# Patient Record
Sex: MALE | Race: WHITE | NOT HISPANIC OR LATINO | Employment: PART TIME | URBAN - METROPOLITAN AREA
[De-identification: names, ages, dates, MRNs, and addresses within clinical notes are randomized per-mention and may not be internally consistent; named-entity substitution may affect disease eponyms.]

---

## 2017-06-09 DIAGNOSIS — G40.209 LOCALIZATION-RELATED PARTIAL EPILEPSY WITH COMPLEX PARTIAL SEIZURES (H): ICD-10-CM

## 2017-06-09 RX ORDER — LEVETIRACETAM 500 MG/1
TABLET ORAL
Qty: 270 TABLET | Refills: 0 | Status: SHIPPED | OUTPATIENT
Start: 2017-06-09 | End: 2017-07-14

## 2017-06-19 DIAGNOSIS — G40.109 LOCALIZATION-RELATED EPILEPSY (H): ICD-10-CM

## 2017-06-19 RX ORDER — LAMOTRIGINE 100 MG/1
TABLET ORAL
Qty: 285 TABLET | Refills: 0 | OUTPATIENT
Start: 2017-06-19

## 2017-06-19 RX ORDER — LAMOTRIGINE 100 MG/1
TABLET ORAL
Qty: 285 TABLET | Refills: 0 | Status: SHIPPED | OUTPATIENT
Start: 2017-06-19 | End: 2017-07-31

## 2017-07-14 DIAGNOSIS — G40.209 LOCALIZATION-RELATED PARTIAL EPILEPSY WITH COMPLEX PARTIAL SEIZURES (H): ICD-10-CM

## 2017-07-14 RX ORDER — LEVETIRACETAM 500 MG/1
TABLET ORAL
Qty: 270 TABLET | Refills: 0 | Status: SHIPPED | OUTPATIENT
Start: 2017-07-14 | End: 2017-08-14

## 2017-07-28 DIAGNOSIS — G40.109 LOCALIZATION-RELATED EPILEPSY (H): ICD-10-CM

## 2017-07-28 RX ORDER — LAMOTRIGINE 100 MG/1
TABLET ORAL
Qty: 285 TABLET | Refills: 0 | Status: CANCELLED | OUTPATIENT
Start: 2017-07-28

## 2017-07-31 ENCOUNTER — TELEPHONE (OUTPATIENT)
Dept: NEUROLOGY | Facility: CLINIC | Age: 47
End: 2017-07-31

## 2017-07-31 DIAGNOSIS — G40.109 LOCALIZATION-RELATED EPILEPSY (H): ICD-10-CM

## 2017-07-31 RX ORDER — LAMOTRIGINE 100 MG/1
TABLET ORAL
Qty: 285 TABLET | Refills: 0 | Status: SHIPPED | OUTPATIENT
Start: 2017-07-31 | End: 2017-08-14

## 2017-08-14 ENCOUNTER — TELEPHONE (OUTPATIENT)
Dept: NEUROLOGY | Facility: CLINIC | Age: 47
End: 2017-08-14

## 2017-08-14 DIAGNOSIS — G40.109 PARTIAL SEIZURE DISORDER (H): Primary | ICD-10-CM

## 2017-08-14 DIAGNOSIS — G40.109 LOCALIZATION-RELATED EPILEPSY (H): ICD-10-CM

## 2017-08-14 DIAGNOSIS — G40.209 LOCALIZATION-RELATED PARTIAL EPILEPSY WITH COMPLEX PARTIAL SEIZURES (H): ICD-10-CM

## 2017-08-14 RX ORDER — LEVETIRACETAM 500 MG/1
TABLET ORAL
Qty: 270 TABLET | Refills: 1 | Status: SHIPPED | OUTPATIENT
Start: 2017-08-14 | End: 2017-08-17

## 2017-08-14 RX ORDER — LAMOTRIGINE 100 MG/1
TABLET ORAL
Qty: 285 TABLET | Refills: 1 | Status: SHIPPED | OUTPATIENT
Start: 2017-08-14 | End: 2017-08-17

## 2017-08-14 NOTE — TELEPHONE ENCOUNTER
Caller: Kip   Relationship to Patient: self   Call Back Number: [phone]   Reason for Call: would like to know if he needs any blood level check. Currently unable to make it to his last appt    Kip had a follow up visit scheduled 8/10/17 for which he had to cancel d/t recurrence of Lymphoma.  He is waiting to her back from [facility] regarding participation in a Lymphoma study.  Kip is in need of anticonvulsant medication refills. In addition, he would like to have AED serum levels drawn.  He will call back in the near future to schedule an office visit with Dr. Moahn.    AED - ANTIEPILEPTIC DRUGS 7/14/2017   levETIRAcetam (Oral) TAKE THREE TABLETS BY MOUTH THREE TIMES DAILY (500 MG TABS)   lamoTRIgine (Oral) TAKE FOUR AND ONE-HALF TABLETS (450MG) BY MOUTH EVERY MORNING AND FIVE TABLETS (500MG) SHADI EVENING (100 MG TABS)     PLAN:    1) FAX lab order to [facility] at [phone].  2) Mr. Dailey will call to schedule a return visit ASAP.  3) RX for LEV and LTG (with one refill) sent to pharmacy.

## 2017-08-17 ENCOUNTER — OFFICE VISIT (OUTPATIENT)
Dept: NEUROLOGY | Facility: CLINIC | Age: 47
End: 2017-08-17

## 2017-08-17 VITALS
SYSTOLIC BLOOD PRESSURE: 131 MMHG | WEIGHT: 292.4 LBS | BODY MASS INDEX: 35.61 KG/M2 | DIASTOLIC BLOOD PRESSURE: 80 MMHG | HEIGHT: 76 IN | HEART RATE: 81 BPM

## 2017-08-17 DIAGNOSIS — G40.209 LOCALIZATION-RELATED PARTIAL EPILEPSY WITH COMPLEX PARTIAL SEIZURES (H): ICD-10-CM

## 2017-08-17 DIAGNOSIS — G40.109 LOCALIZATION-RELATED EPILEPSY (H): Primary | ICD-10-CM

## 2017-08-17 DIAGNOSIS — G40.109 PARTIAL SEIZURE DISORDER (H): ICD-10-CM

## 2017-08-17 RX ORDER — LEVETIRACETAM 500 MG/1
TABLET ORAL
Qty: 270 TABLET | Refills: 11 | Status: SHIPPED | OUTPATIENT
Start: 2017-08-17 | End: 2018-07-19

## 2017-08-17 RX ORDER — LAMOTRIGINE 100 MG/1
TABLET ORAL
Qty: 270 TABLET | Refills: 11 | Status: SHIPPED | OUTPATIENT
Start: 2017-08-17 | End: 2017-09-21

## 2017-08-17 NOTE — MR AVS SNAPSHOT
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      Future tests that were ordered for you today     Open Future Orders        Priority Expected Expires Ordered    Keppra (Levetiracetam) Level Routine  8/31/2017 8/17/2017    Lamotrigine Level Routine  8/31/2017 8/17/2017            Who to contact     Please call your clinic at [phone] to:    Ask questions about your health    Make or cancel appointments    Discuss your medicines    Learn about your test results    Speak to your doctor   If you have compliments or concerns about an experience at your clinic, or if you wish to file a complaint, please contact [facility] Patient Relations at [phone] or email us at [email or web address]         Additional Information About Your Visit        MyChart Information     [facility] gives you secure access to your electronic health record. If you see a primary care provider, you can also send messages to your care team and make appointments. If you have questions, please call your primary care clinic.  If you do not have a primary care provider, please call [phone] and they will assist you.      [facility] [facility] is an electronic gateway that provides easy, online access to your medical records. With [facility], you can request a clinic appointment, read your test results, renew a prescription or communicate with your care team.     To access your existing account, please  "contact your [facility] or call [phone] for assistance.        Care EveryWhere ID     This is your Care EveryWhere ID. This could be used by other organizations to access your [place] medical records  [id number]        Your Vitals Were     Pulse Height BMI (Body Mass Index)             81 6' 4.34\" (193.9 cm) 35.28 kg/m2          Blood Pressure from Last 3 Encounters:   08/17/17 131/80   08/16/16 134/76   09/18/15 124/84    Weight from Last 3 Encounters:   08/17/17 292 lb 6.4 oz (132.6 kg)   08/16/16 300 lb (136.1 kg)   09/18/15 294 lb 9.6 oz (133.6 kg)                 Today's Medication Changes          These changes are accurate as of: 8/17/17  9:13 AM.  If you have any questions, ask your nurse or doctor.               These medicines have changed or have updated prescriptions.        Dose/Directions    lamoTRIgine 100 MG tablet   Commonly known as:  LaMICtal   This may have changed:  additional instructions   Used for:  Localization-related epilepsy (H), Partial seizure disorder (H)   Changed by:  Flaquita Tam MD        TAKE FOUR AND ONE-HALF TABLETS (450MG) BY MOUTH TWICE A DAY.   Quantity:  270 tablet   Refills:  11            Where to get your medicines      These medications were sent to [facility] [address] [place] [address]  [address] [address]     Phone:  [phone]     lamoTRIgine 100 MG tablet    levETIRAcetam 500 MG tablet                Primary Care Provider Office Phone # Fax #    Michael Sharma [phone]       [facility] [address] [facility] [address]        Equal Access to Services     MARGARITA VARMA AH: Rohan Hernandez, wafernandada luqadaha, qatatata kaalmada patrica, panfilo luna. So [facility] [phone].    ATENCIÓN: Si habla español, tiene a cueto disposición servicios gratuitos de asistencia lingüística. Llame al [phone].    We comply " with applicable federal civil rights laws and [place] laws. We do not discriminate on the basis of race, color, national origin, age, disability sex, sexual orientation or gender identity.            Thank you!     Thank you for choosing [facility]  for your care. Our goal is always to provide you with excellent care. Hearing back from our patients is one way we can continue to improve our services. Please take a few minutes to complete the written survey that you may receive in the mail after your visit with us. Thank you!             Your Updated Medication List - Protect others around you: Learn how to safely use, store and throw away your medicines at www.disposemymeds.org.          This list is accurate as of: 8/17/17  9:13 AM.  Always use your most recent med list.                   Brand Name Dispense Instructions for use Diagnosis    lamoTRIgine 100 MG tablet    LaMICtal    270 tablet    TAKE FOUR AND ONE-HALF TABLETS (450MG) BY MOUTH TWICE A DAY.    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

## 2017-08-17 NOTE — PROGRESS NOTES
[facility] Progress Note      Patient:  Kip Barajas  :  1970   Age:  47 year old   Today's Office Visit:  2017    Epilepsy Data:    Patient History  Primary Epileptologist/Provider: Flaquita Tam M.D. 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     Tests/Surgery History  Last EEG: (P) 12  Last MRI: (P) 08  Last Neuropsych Testing: (P) 08    Seizure Record  Current Visit Date: (P) 17  Previous Visit Date: (P) 16  Months since last visit: (P) 12.02  Seizure Type 1: (P) Simple partial seizures with sensory symptoms  Seizure Type 2: (P) Simple partial seizures with motor signs  Seizure Type 3: (P) Complex partial seizures unspecified  Seizure Type 4: (P) Partial seizures with secondary generalization  -  with complex partial seizures evolving to generalized seizures    History of Present Illness:     The patient did not have any seizures since last year.  His last seizure was  when he was in ICU for stem cell transplant and it got complicated by high fever and hypotension.      He had a PET scan 2 weeks ago and unfortunately his lymphoma came back.  2 new spots were spotted.  Study for Tacrolimus stopped and he discontinued the medication. Brand new drug study would start at [place] that he is gonna be eligible for.        Current Outpatient Prescriptions   Medication Sig Dispense Refill     levETIRAcetam (KEPPRA) 500 MG tablet TAKE THREE TABLETS BY MOUTH THREE TIMES DAILY 270 tablet 1     lamoTRIgine (LAMICTAL) 100 MG tablet TAKE FOUR AND ONE-HALF TABLETS (450MG) BY MOUTH EVERY MORNING AND FIVE TABLETS (500MG) SHADI EVENING 285 tablet 1      Results for KIP BARAJAS (MRN [id number]) as of 2017 08:40   Ref. Range 2016 11:30   Lamotrigine Level Unknown 21.0 (H)   Levetiracetam Level Unknown 45.2 (H)     Medication Notes:      "   AED Medication Compliance:  compliant most of the time  Using a pill box:  Yes    Review of Systems:  Lethargy / Tiredness:  No  Nausea / Vomiting:  No  Double Vision:  No  Sleepiness:  No  Depression:  No  Slowed Cognitive Function:  No  Memory Problems:  No  Poor Balance:  No  Dizziness:  No  Appetite Changes:  No  Blurred Vision:  No  Sleep Changes:  No  Behavioral Changes:  No  Skin: negative  Respiratory: No shortness of breath and No cough  Cardiovascular: negative  Have you experienced a traumatic fall since your last visit: NO    Other Issues:   Is patient safe to drive:  Yes    Exam:    /80 (BP Location: Left arm, Patient Position: Chair, Cuff Size: Adult Regular)  Pulse 81  Ht 6' 4.34\" (193.9 cm)  Wt 292 lb 6.4 oz (132.6 kg)  BMI 35.28 kg/m2     Wt Readings from Last 5 Encounters:   08/17/17 292 lb 6.4 oz (132.6 kg)   08/16/16 300 lb (136.1 kg)   09/18/15 294 lb 9.6 oz (133.6 kg)   03/06/15 294 lb (133.4 kg)   08/11/14 288 lb 9.6 oz (130.9 kg)     General appearance: Alert, awake, very pleasant, and cooperative, NAD  Gait: Wide-based gait due to body habitus. Difficulty with tandem gait.   Language/Speech: No aphasia or dysarthria.   Extraocular movements intact. No nystagmus.   Coordination: normal FNF  Face is symmetric.   Tongue is midline.   Limb strength 5/5 bilaterally with no drift and normal tone.    Assessment and Plan:     1. Localization-related epilepsy:  Seizures have been stable. Last seizure was in 2011.  He is on dual therapy with LMT and LVT. Levels are towards the higher range.  We decided to decrease LMT by 50 mg at night.    2. Tremors:  Stable, worse when he writes something, but when he concentrates it is better.  He has more trouble holding and carrying object. At this point he rather not to take any medication for it.       - Decrease lamotrigine to 450 mg bid  - Obtain lamotrigine and levetiracetam in 1-2 weeks  - RTC in 1 year      As described above, I met with the " patient for 25 minutes and during this time counseling was greater than 50% of the visit time.    Flaquita Tam MD

## 2017-08-17 NOTE — LETTER
2017       RE: Kip Dailey  : 1970   MRN: [id number]      Dear Colleague,    Thank you for referring your patient, Kip Dailey, to the [facility] at [facility]. Please see a copy of my visit note below.    [facility]      Patient:  Kip Dailey  :  1970   Age:  47 year old   Today's Office Visit:  2017    Epilepsy Data:  Patient History  Primary Epileptologist/Provider: ([place]) Flaquita Tam M.D.  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     Tests/Surgery History  Last EEG: (P) 12  Last MRI: (P) 08  Last Neuropsych Testing: (P) 08    Seizure Record  Current Visit Date: (P) 17  Previous Visit Date: (P) 16  Months since last visit: (P) 12.02  Seizure Type 1: (P) Simple partial seizures with sensory symptoms  Seizure Type 2: (P) Simple partial seizures with motor signs  Seizure Type 3: (P) Complex partial seizures unspecified  Seizure Type 4: (P) Partial seizures with secondary generalization  -  with complex partial seizures evolving to generalized seizures    History of Present Illness:   The patient did not have any seizures since last year.  His last seizure was  when he was in ICU for stem cell transplant and it got complicated by high fever and hypotension.      He had a PET scan 2 weeks ago and unfortunately his lymphoma came back.  2 new spots were spotted.  Study for Tacrolimus stopped and he discontinued the medication. Brand new drug study would start at [place] that he is gonna be eligible for.      Current Outpatient Prescriptions   Medication Sig Dispense Refill     levETIRAcetam (KEPPRA) 500 MG tablet TAKE THREE TABLETS BY MOUTH THREE TIMES DAILY 270 tablet 1     lamoTRIgine (LAMICTAL) 100 MG tablet TAKE FOUR AND ONE-HALF TABLETS (450MG) BY MOUTH EVERY  "MORNING AND FIVE TABLETS (500MG) SHADI EVENING 285 tablet 1      Results for IRLANDA BARAJAS (MRN [id number]) as of 8/17/2017 08:40   Ref. Range 8/16/2016 11:30   Lamotrigine Level Unknown 21.0 (H)   Levetiracetam Level Unknown 45.2 (H)     Medication Notes:    AED Medication Compliance:  compliant most of the time  Using a pill box:  Yes    Review of Systems:  Lethargy / Tiredness:  No  Nausea / Vomiting:  No  Double Vision:  No  Sleepiness:  No  Depression:  No  Slowed Cognitive Function:  No  Memory Problems:  No  Poor Balance:  No  Dizziness:  No  Appetite Changes:  No  Blurred Vision:  No  Sleep Changes:  No  Behavioral Changes:  No  Skin: negative  Respiratory: No shortness of breath and No cough  Cardiovascular: negative  Have you experienced a traumatic fall since your last visit: NO    Other Issues:   Is patient safe to drive:  Yes    Exam:  /80 (BP Location: Left arm, Patient Position: Chair, Cuff Size: Adult Regular)  Pulse 81  Ht 6' 4.34\" (193.9 cm)  Wt 292 lb 6.4 oz (132.6 kg)  BMI 35.28 kg/m2   Wt Readings from Last 5 Encounters:   08/17/17 292 lb 6.4 oz (132.6 kg)   08/16/16 300 lb (136.1 kg)   09/18/15 294 lb 9.6 oz (133.6 kg)   03/06/15 294 lb (133.4 kg)   08/11/14 288 lb 9.6 oz (130.9 kg)     General appearance: Alert, awake, very pleasant, and cooperative, NAD  Gait: Wide-based gait due to body habitus. Difficulty with tandem gait.   Language/Speech: No aphasia or dysarthria.   Extraocular movements intact. No nystagmus.   Coordination: normal FNF  Face is symmetric.   Tongue is midline.   Limb strength 5/5 bilaterally with no drift and normal tone.    Assessment and Plan:   1. Localization-related epilepsy:  Seizures have been stable. Last seizure was in 2011.  He is on dual therapy with LMT and LVT. Levels are towards the higher range.  We decided to decrease LMT by 50 mg at night.    2. Tremors:  Stable, worse when he writes something, but when he concentrates it is better.  He has more " trouble holding and carrying object. At this point he rather not to take any medication for it.     - Decrease lamotrigine to 450 mg bid  - Obtain lamotrigine and levetiracetam in 1-2 weeks  - RTC in 1 year    As described above, I met with the patient for 25 minutes and during this time counseling was greater than 50% of the visit time.    Flaquita Tam MD

## 2017-09-13 ENCOUNTER — TRANSFERRED RECORDS (OUTPATIENT)
Dept: HEALTH INFORMATION MANAGEMENT | Facility: CLINIC | Age: 47
End: 2017-09-13

## 2017-09-15 LAB
KEPPRA (LEVETIRACETAM) LEVEL: 46.7 MCG/ML (ref 12–46)
LAMOTRIGINE SERPL-MCNC: 12.3 MCG/ML (ref 2.5–15)

## 2017-09-21 ENCOUNTER — TELEPHONE (OUTPATIENT)
Dept: NEUROLOGY | Facility: CLINIC | Age: 47
End: 2017-09-21

## 2017-09-21 DIAGNOSIS — G40.109 PARTIAL SEIZURE DISORDER (H): ICD-10-CM

## 2017-09-21 DIAGNOSIS — G40.109 LOCALIZATION-RELATED EPILEPSY (H): Primary | ICD-10-CM

## 2017-09-21 RX ORDER — LAMOTRIGINE 100 MG/1
TABLET ORAL
Qty: 285 TABLET | Refills: 11 | Status: SHIPPED | OUTPATIENT
Start: 2017-09-21 | End: 2018-07-19

## 2017-09-21 NOTE — TELEPHONE ENCOUNTER
"Caller: Kip   Relationship to Patient: Self   Call Back Number: [phone]   Reason for Call: Patient is calling to discuss a medication change and go over recent lab results. Please give him a call back. Thanks.   Keerthi Sims CMA    Currently prescribed 450 BID.  Kip would like to know if he can increase LTG from 450 mg HS to 500 mg.  He hasn't \"felt quite right since we went down to 450 mg.  No seizures.     Ref. Range 9/13/2017 13:06   Keppra (Levetiracetam) Level Latest Ref Range: 12.0 - 46.0 mcg/mL 46.7 (A)   Lamotrigine Level Latest Ref Range: 2.5 - 15.0 mcg/mL 12.3     PLAN: Discussed with Dr. Alvares    1) Increase Lamotrigine to 450-500.  2) RX sent to pharmacy  3) Encouraged Kip to call if side effects or other concerns.    "

## 2017-10-11 ENCOUNTER — TELEPHONE (OUTPATIENT)
Dept: NEUROLOGY | Facility: CLINIC | Age: 47
End: 2017-10-11

## 2017-10-11 NOTE — TELEPHONE ENCOUNTER
[employer]V form received, via fax on 10/11/2017. Form placed in DMV folder to be completed by an RN. States that they need this done and faxed by 10/13/2017.    Donna Saldaña, CMA

## 2017-10-13 NOTE — TELEPHONE ENCOUNTER
DMV form signed, faxed to [employer] on 10/13/2017, sent to scanning, and copy mailed to patient.     Donna Saldaña, CMA

## 2018-07-09 ENCOUNTER — TELEPHONE (OUTPATIENT)
Dept: NEUROLOGY | Facility: CLINIC | Age: 48
End: 2018-07-09

## 2018-07-09 DIAGNOSIS — G40.209 LOCALIZATION-RELATED PARTIAL EPILEPSY WITH COMPLEX PARTIAL SEIZURES (H): Primary | ICD-10-CM

## 2018-07-09 NOTE — TELEPHONE ENCOUNTER
The patient calls the clinic today reporting a return of seizures over the past week. His epilepsy provider is Dr. Tam.    Assessment:  1) Diagnosis: Localization-related epilepsy    2) Description of seizure: Since July 2nd, having up to 6 seizures a day. Left side becomes numb, I'm aware of my surrounding during the seizure.   3) Date of last seizure: many years.   4) Duration of seizure: 15-30 seconds  5) Missed medication/new medications/other med changes: On going immunotherapy treatments.   6) Sleep deprivation/illness/stress, other concerns:  It was hot this past month. 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  450-(200)-500. Lovenox 175 mg injection daily (DVT this last March).  9) Tolerating the current doses of AEDs? Yes.   10) Last labs:    Results for IRLANDA BARAJAS (MRN [id number]) as of 7/9/2018 11:36   Ref. Range 8/16/2016 11:30 9/13/2017 13:06   Keppra (Levetiracetam) Level Latest Ref Range: 12.0 - 46.0 mcg/mL  46.7 (A)   Lamotrigine Level Latest Ref Range: 2.5 - 15.0 mcg/mL 21.0 (H) 12.3   Levetiracetam Level Unknown 45.2 (H)        IRLANDA BARAJAS MRN: [id number]    Date: 7/19/2018 Status: [facility]   Time: 9:30 AM Length: 30     Visit Type: [facility] RETURN [[id number]]   JEOVANNY:     Provider: Flaquita Tam MD Department: [facility]     PLAN:  Send labs to [facility].   Get labs drawn near home. Follow up with Dr. Mohan as previously scheduled. On 7/19/18.  Review with Dr. Mohan if he should continue the increase in lamotrigine until his clinic appointment.  OK to leave a voicemail with results.       ADDENDUM:  Chart discussed with Dr. Pantoja. Verbal orders for patient to continue taking the increased dose of lamotrigine as long as he is tolerating it. This was  shared with the patient by telephone.

## 2018-07-09 NOTE — TELEPHONE ENCOUNTER
----- Message from Karon Ivory LPN sent at 7/9/2018  9:11 AM CDT -----  Regarding: librado  Caller: Kip    Relationship to Patient: self    Call Back Number: [phone]    Reason for Call: Patient is having breakthrough seizures (15-30 seconds at the longest). He was told to call if this happens.

## 2018-07-10 ENCOUNTER — TRANSFERRED RECORDS (OUTPATIENT)
Dept: HEALTH INFORMATION MANAGEMENT | Facility: CLINIC | Age: 48
End: 2018-07-10

## 2018-07-10 LAB
KEPPRA (LEVETIRACETAM) LEVEL: 33.1 MCG/ML (ref 12–46)
LAMOTRIGINE SERPL-MCNC: 12.5 MCG/ML (ref 2.5–15)

## 2018-07-19 ENCOUNTER — OFFICE VISIT (OUTPATIENT)
Dept: NEUROLOGY | Facility: CLINIC | Age: 48
End: 2018-07-19
Payer: COMMERCIAL

## 2018-07-19 VITALS
WEIGHT: 315 LBS | TEMPERATURE: 97.1 F | SYSTOLIC BLOOD PRESSURE: 132 MMHG | BODY MASS INDEX: 38.2 KG/M2 | HEART RATE: 93 BPM | DIASTOLIC BLOOD PRESSURE: 86 MMHG

## 2018-07-19 DIAGNOSIS — G40.209 LOCALIZATION-RELATED PARTIAL EPILEPSY WITH COMPLEX PARTIAL SEIZURES (H): ICD-10-CM

## 2018-07-19 DIAGNOSIS — G40.109 FOCAL EPILEPSY (H): Primary | ICD-10-CM

## 2018-07-19 DIAGNOSIS — G40.109 LOCALIZATION-RELATED EPILEPSY (H): ICD-10-CM

## 2018-07-19 DIAGNOSIS — G40.109 PARTIAL SEIZURE DISORDER (H): ICD-10-CM

## 2018-07-19 RX ORDER — LEVETIRACETAM 500 MG/1
TABLET ORAL
Qty: 270 TABLET | Refills: 11 | Status: SHIPPED | OUTPATIENT
Start: 2018-07-19 | End: 2019-07-11

## 2018-07-19 RX ORDER — ENOXAPARIN SODIUM 100 MG/ML
175 INJECTION SUBCUTANEOUS EVERY 12 HOURS
COMMUNITY

## 2018-07-19 RX ORDER — LAMOTRIGINE 100 MG/1
TABLET ORAL
Qty: 345 TABLET | Refills: 11 | Status: SHIPPED | OUTPATIENT
Start: 2018-07-19 | End: 2019-07-11

## 2018-07-19 ASSESSMENT — PAIN SCALES - GENERAL: PAINLEVEL: NO PAIN (0)

## 2018-07-19 NOTE — PROGRESS NOTES
"[facility]      Patient:  Kip Dailey  :  1970   Age:  48 year old   Today's Office Visit:  2018    Epilepsy Data:    Patient History  Primary Epileptologist/Provider: Flaquita Tam M.D. 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     Tests/Surgery History  Last EEG: (P) 12  Last MRI: (P) 08  Last Neuropsych Testing: (P) 08    Seizure Record  Current Visit Date: (P) 18  Previous Visit Date: (P) 17  Months since last visit: (P) 11.04  Seizure Type 1: (P) Simple partial seizures with sensory symptoms  Seizure Type 2: (P) Simple partial seizures with motor signs  Seizure Type 3: (P) Complex partial seizures unspecified  Seizure Type 4: (P) Partial seizures with secondary generalization  -  with complex partial seizures evolving to generalized seizures    History of Present Illness:    The patient is here for a follow up on his seizures.  He noted a recurrence of his seizures since .  His seizures were controlled for a while; he thinks heat and dehydration may have triggered them.  Seizures are the same as before. He has a warning with feeling tingly mainly on left side, then left arm goes numb, left eye drifts, sometimes head turns a little to left.  He was told hometimes can say \"yes\" or \"No to questions, sometimes not.  Last 15-30 seconds, and within 5 seconds he is able to carry on a normal conversation.    They happened multiple times a day:   July 3rd: 2  July 4: 3  July 6: 6  July 7: 8  July 8: 6  July 9: 4  July 10: 3  July 12: 2  July 13: 6  July 15: 1  He increased his lamotrigine by 100 mg, it didn't help much; so he added another 100 mg/d.  He is taking this extra 200 mg at noon.  He used to take 450 mg am and 500 mg pm.  His seizures decreased.  He did not have any since .  In the past when he " was taking 500 mg am, he ws getting dizzy.      In the last visit, he informed me that his hodgkin's lymphoma recurred.  He was going to start a new medication.  He was started on Keytruda. He also participated in a drug trial AFM 13 wgich was discontinued.  Her Hodgkin's lymphoma is now in remission.  He is going to finish the therapy with Keytruda in couple months.  [facility] Dr. Michael oshea for his Hodgkin's lymphoma.  He is going to see him in treatment every 3 weeks.  He says he stays with a friend so it is not that problematic for him to go every 3 weeks.    The patient had a fall on ice in March 2018.  He was found to have a blood clot about a week later. He has a history of blood clot in his left leg.  He is currently taking Lovenox.     Current Outpatient Prescriptions   Medication Sig Dispense Refill     enoxaparin (LOVENOX) 100 MG/ML SOLN Inject 175 mg Subcutaneous every 12 hours       lamoTRIgine (LAMICTAL) 100 MG tablet TAKE FOUR AND ONE-HALF TABLETS (450MG) BY MOUTH AM and FIVE TABLETS (500 MG) HS. (Patient taking differently: 200 mg TAKE FOUR AND ONE-HALF TABLETS (450MG) BY MOUTH AM and FIVE TABLETS (500 MG) HS.) 285 tablet 11     levETIRAcetam (KEPPRA) 500 MG tablet TAKE THREE TABLETS BY MOUTH THREE TIMES DAILY 270 tablet 11     LORazepam (ATIVAN PO) Take 0.5 mg by mouth        Results for IRLANDA BARAJAS (MRN [id number]) as of 7/19/2018 09:41   Ref. Range 9/13/2017 13:06   Keppra (Levetiracetam) Level Latest Ref Range: 12.0 - 46.0 mcg/mL 46.7 (A)   Lamotrigine Level Latest Ref Range: 2.5 - 15.0 mcg/mL 12.3     Medication Notes:        AED Medication Compliance:  compliant most of the time  Using a pill box:  Yes    Review of Systems:  Lethargy / Tiredness:  No  Nausea / Vomiting:  No  Double Vision:  No  Sleepiness:  No  Depression:  No  Anxiety: some  Slowed Cognitive Function:  No  Memory Problems:  Yes  Poor Balance:  No  Dizziness:  No  Appetite Changes:  No  Blurred Vision:   No  Headache: some  Sleep Changes:  No  Behavioral Changes:  No  Skin: negative  Respiratory: No shortness of breath and No cough  Cardiovascular: negative  Have you experienced a traumatic fall since your last visit: NO    Other Issues:    Is patient safe to drive:  No    Exam:    /86 (BP Location: Left arm, Patient Position: Chair, Cuff Size: Adult Regular)  Pulse 93  Temp 97.1  F (36.2  C)  Wt 316 lb 9.6 oz (143.6 kg)  BMI 38.2 kg/m2     Wt Readings from Last 5 Encounters:   07/19/18 316 lb 9.6 oz (143.6 kg)   08/17/17 292 lb 6.4 oz (132.6 kg)   08/16/16 300 lb (136.1 kg)   09/18/15 294 lb 9.6 oz (133.6 kg)   03/06/15 294 lb (133.4 kg)     General appearance: Alert, awake, very pleasant, and cooperative, NAD  Gait: Wide-based gait due to body habitus.   Language/Speech: No aphasia or dysarthria.   Extraocular movements intact. No nystagmus.   Coordination: normal FNF  Face is symmetric.   Tongue is midline.   Limb strength 5/5 bilaterally with no drift and normal tone.    Assessment and Plan:    1. Localization-related epilepsy:   Patient's seizures have been controlled for a while but they recurred since July 3 and he has had multiple seizures per day.  He increased his lamotrigine by 100 mg at noon which did not help much so he increased it by 200 mg at noon and his seizures decreased in the last 1 was on Sunday.  On 500 mg dose in the morning he experienced dizziness.  I am going to check lamotrigine and levetiracetam level.  If the patient's seizures continue and lamotrigine level allowed we will increase lamotrigine by another 100 mg at noon if the level was too high then we will need to add another medication.  Possibility is that the patient had approximately 24 pounds since last visit which may have caused lower lamotrigine level.  The other trigger is that he needs and the dehydration which the patient has noted increased seizures.  I counseled the patient regarding weight loss.  His previous  chemotherapy medication was causing decreased appetite and since he has been off that he has had weight gain.  I advised him to choose low sugar diet, such as modified Atkins diet or low glycemic index diet which can also help with his seizure control.    - Continue lamotrigine and levetiracetam as before  - Obtain AED levels for efficacy and toxicity  - Return to clinic in 6 month      As described above, I met with the patient for 25 minutes and during this time counseling was greater than 50% of the visit time.  Flaquita Tam MD    Dictation was done via the dragon device.

## 2018-07-19 NOTE — MR AVS SNAPSHOT
After Visit Summary   7/19/2018    Kip Dailey    MRN: [id number]           Patient Information     Date Of Birth          1970        Visit Information        Provider Department      7/19/2018 9:30 AM Flaquita Tam MD [facility]        Today's Diagnoses     Focal epilepsy (H)    -  1    Localization-related partial epilepsy with complex partial seizures (H)        Partial seizure disorder (H)        Localization-related epilepsy (H)           Follow-ups after your visit        Follow-up notes from your care team     Return in about 6 months (around 1/19/2019).      Your next 10 appointments already scheduled     Won 10, 2019 10:30 AM CST   Return Visit with MD [facility] ([facility])    [address], [address]  [facility] [address] [phone]              Who to contact     Please call your clinic at [phone] to:    Ask questions about your health    Make or cancel appointments    Discuss your medicines    Learn about your test results    Speak to your doctor            Additional Information About Your Visit        Podcast Readyhart Information     IT MOVES IT gives you secure access to your electronic health record. If you see a primary care provider, you can also send messages to your care team and make appointments. If you have questions, please call your primary care clinic.  If you do not have a primary care provider, please call [phone] and they will assist you.      IT MOVES IT is an electronic gateway that provides easy, online access to your medical records. With IT MOVES IT, you can request a clinic appointment, read your test results, renew a prescription or communicate with your care team.     To access your existing account, please contact your [facility] or call [phone] for assistance.        Care EveryWhere ID     This is your Care EveryWhere ID. This could be used by other  organizations to access your [facility] medical records  [id number]        Your Vitals Were     Pulse Temperature BMI (Body Mass Index)             93 97.1  F (36.2  C) 38.2 kg/m2          Blood Pressure from Last 3 Encounters:   07/19/18 132/86   08/17/17 131/80   08/16/16 134/76    Weight from Last 3 Encounters:   07/19/18 143.6 kg (316 lb 9.6 oz)   08/17/17 132.6 kg (292 lb 6.4 oz)   08/16/16 136.1 kg (300 lb)              We Performed the Following     Keppra (Levetiracetam) Level     Lamotrigine Level          Today's Medication Changes          These changes are accurate as of 7/19/18  2:18 PM.  If you have any questions, ask your nurse or doctor.               These medicines have changed or have updated prescriptions.        Dose/Directions    lamoTRIgine 100 MG tablet   Commonly known as:  LaMICtal   This may have changed:  additional instructions   Used for:  Localization-related epilepsy (H), Partial seizure disorder (H)   Changed by:  Flaquita Tam MD        TAKE FOUR AND ONE-HALF TABLETS (450MG) BY MOUTH AM and 2 TABS AT NOON AND FIVE TABLETS (500 MG) HS.   Quantity:  345 tablet   Refills:  11            Where to get your medicines      These medications were sent to [facility] [address] [place] [address]  [address] [place] [address]     Phone:  [phone]     lamoTRIgine 100 MG tablet    levETIRAcetam 500 MG tablet                Primary Care Provider Office Phone # Fax #    Michael Sharma [phone]       [facility] [address] [facility] [address]        Equal Access to Services     [facility]: Hadswapna Hernandez, stacie cuellar, el moisealpanfilo louis. So [facility] [phone].    ATENCIÓN: Si habla español, tiene a cueto disposición servicios gratuitos de asistencia lingüística. Llame al [phone].    We comply with applicable federal civil rights laws and [place]  laws. We do not discriminate on the basis of race, color, national origin, age, disability, sex, sexual orientation, or gender identity.            Thank you!     Thank you for choosing [facility]  for your care. Our goal is always to provide you with excellent care. Hearing back from our patients is one way we can continue to improve our services. Please take a few minutes to complete the written survey that you may receive in the mail after your visit with us. Thank you!             Your Updated Medication List - Protect others around you: Learn how to safely use, store and throw away your medicines at www.disposemymeds.org.          This list is accurate as of 7/19/18  2:18 PM.  Always use your most recent med list.                   Brand Name Dispense Instructions for use Diagnosis    ATIVAN PO      Take 0.5 mg by mouth        enoxaparin 100 MG/ML Soln    LOVENOX     Inject 175 mg Subcutaneous every 12 hours        lamoTRIgine 100 MG tablet    LaMICtal    345 tablet    TAKE FOUR AND ONE-HALF TABLETS (450MG) BY MOUTH AM and 2 TABS AT NOON AND FIVE TABLETS (500 MG) HS.    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

## 2018-07-19 NOTE — LETTER
"2018       RE: Kip Dailey  : 1970   MRN: [id number]      Dear Colleague,    Thank you for referring your patient, Kip Dailey, to the [facility] Please see a copy of my visit note below.    [facility] [place] [facility]      Patient:  Kip Dailey  :  1970   Age:  48 year old   Today's Office Visit:  2018    Epilepsy Data:    Patient History  Primary Epileptologist/Provider: DAVE) Flaquita Tma M.D.  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     Tests/Surgery History  Last EEG: (P) 12  Last MRI: (P) 08  Last Neuropsych Testing: (P) 08    Seizure Record  Current Visit Date: (P) 18  Previous Visit Date: (P) 17  Months since last visit: (P) 11.04  Seizure Type 1: (P) Simple partial seizures with sensory symptoms  Seizure Type 2: (P) Simple partial seizures with motor signs  Seizure Type 3: (P) Complex partial seizures unspecified  Seizure Type 4: (P) Partial seizures with secondary generalization  -  with complex partial seizures evolving to generalized seizures    History of Present Illness:    The patient is here for a follow up on his seizures.  He noted a recurrence of his seizures since .  His seizures were controlled for a while; he thinks heat and dehydration may have triggered them.  Seizures are the same as before. He has a warning with feeling tingly mainly on left side, then left arm goes numb, left eye drifts, sometimes head turns a little to left.  He was told hometimes can say \"yes\" or \"No to questions, sometimes not.  Last 15-30 seconds, and within 5 seconds he is able to carry on a normal conversation.    They happened multiple times a day:   : 2  July 4: 3  July 6: 6  July 7: 8  July 8: 6  July 9: 4  July 10: 3  July 12: 2  July 13: " 6  July 15: 1  He increased his lamotrigine by 100 mg, it didn't help much; so he added another 100 mg/d.  He is taking this extra 200 mg at noon.  He used to take 450 mg am and 500 mg pm.  His seizures decreased.  He did not have any since Sunday.  In the past when he was taking 500 mg am, he ws getting dizzy.      In the last visit, he informed me that his hodgkin's lymphoma recurred.  He was going to start a new medication.  He was started on Keytruda. He also participated in a drug trial AFM 13 wgich was discontinued.  Her Hodgkin's lymphoma is now in remission.  He is going to finish the therapy with Keytruda in couple months.  He is followed at [facility] by Dr. Michael oshea for his Hodgkin's lymphoma.  He is going to see him in treatment every 3 weeks.  He says he stays with a friend so it is not that problematic for him to go every 3 weeks.    The patient had a fall on ice in March 2018.  He was found to have a blood clot about a week later. He has a history of blood clot in his left leg.  He is currently taking Lovenox.     Current Outpatient Prescriptions   Medication Sig Dispense Refill     enoxaparin (LOVENOX) 100 MG/ML SOLN Inject 175 mg Subcutaneous every 12 hours       lamoTRIgine (LAMICTAL) 100 MG tablet TAKE FOUR AND ONE-HALF TABLETS (450MG) BY MOUTH AM and FIVE TABLETS (500 MG) HS. (Patient taking differently: 200 mg TAKE FOUR AND ONE-HALF TABLETS (450MG) BY MOUTH AM and FIVE TABLETS (500 MG) HS.) 285 tablet 11     levETIRAcetam (KEPPRA) 500 MG tablet TAKE THREE TABLETS BY MOUTH THREE TIMES DAILY 270 tablet 11     LORazepam (ATIVAN PO) Take 0.5 mg by mouth        Results for IRLANDA BARAJAS (MRN [id number]) as of 7/19/2018 09:41   Ref. Range 9/13/2017 13:06   Keppra (Levetiracetam) Level Latest Ref Range: 12.0 - 46.0 mcg/mL 46.7 (A)   Lamotrigine Level Latest Ref Range: 2.5 - 15.0 mcg/mL 12.3     Medication Notes:        AED Medication Compliance:  compliant most of the time  Using a pill box:   Yes    Review of Systems:  Lethargy / Tiredness:  No  Nausea / Vomiting:  No  Double Vision:  No  Sleepiness:  No  Depression:  No  Anxiety: some  Slowed Cognitive Function:  No  Memory Problems:  Yes  Poor Balance:  No  Dizziness:  No  Appetite Changes:  No  Blurred Vision:  No  Headache: some  Sleep Changes:  No  Behavioral Changes:  No  Skin: negative  Respiratory: No shortness of breath and No cough  Cardiovascular: negative  Have you experienced a traumatic fall since your last visit: NO    Other Issues:    Is patient safe to drive:  No    Exam:    /86 (BP Location: Left arm, Patient Position: Chair, Cuff Size: Adult Regular)  Pulse 93  Temp 97.1  F (36.2  C)  Wt 316 lb 9.6 oz (143.6 kg)  BMI 38.2 kg/m2     Wt Readings from Last 5 Encounters:   07/19/18 316 lb 9.6 oz (143.6 kg)   08/17/17 292 lb 6.4 oz (132.6 kg)   08/16/16 300 lb (136.1 kg)   09/18/15 294 lb 9.6 oz (133.6 kg)   03/06/15 294 lb (133.4 kg)     General appearance: Alert, awake, very pleasant, and cooperative, NAD  Gait: Wide-based gait due to body habitus.   Language/Speech: No aphasia or dysarthria.   Extraocular movements intact. No nystagmus.   Coordination: normal FNF  Face is symmetric.   Tongue is midline.   Limb strength 5/5 bilaterally with no drift and normal tone.    Assessment and Plan:    1. Localization-related epilepsy:    Patient's seizures have been controlled for a while but they recurred since July 3 and he has had multiple seizures per day.  He increased his lamotrigine by 100 mg at noon which did not help much so he increased it by 200 mg at noon and his seizures decreased in the last 1 was on Sunday.  On 500 mg dose in the morning he experienced dizziness.  I am going to check lamotrigine and levetiracetam level.  If the patient's seizures continue and lamotrigine level allowed we will increase lamotrigine by another 100 mg at noon if the level was too high then we will need to add another medication.  Possibility  is that the patient had approximately 24 pounds since last visit which may have caused lower lamotrigine level.  The other trigger is that he needs and the dehydration which the patient has noted increased seizures.  I counseled the patient regarding weight loss.  His previous chemotherapy medication was causing decreased appetite and since he has been off that he has had weight gain.  I advised him to choose low sugar diet, such as modified Atkins diet or low glycemic index diet which can also help with his seizure control.    - Continue lamotrigine and levetiracetam as before  - Obtain AED levels for efficacy and toxicity  - Return to clinic in 6 month      As described above, I met with the patient for 25 minutes and during this time counseling was greater than 50% of the visit time.  Flaquita Tam MD    Dictation was done via the dragon device.                      Again, thank you for allowing me to participate in the care of your patient.      Sincerely,    Flaquita Tam MD

## 2018-07-21 LAB
LAMOTRIGINE SERPL-MCNC: 12.7 UG/ML (ref 2.5–15)
LEVETIRACETAM SERPL-MCNC: 45 UG/ML (ref 12–46)

## 2018-07-27 DIAGNOSIS — G40.209 LOCALIZATION-RELATED PARTIAL EPILEPSY WITH COMPLEX PARTIAL SEIZURES (H): ICD-10-CM

## 2018-07-31 ENCOUNTER — TELEPHONE (OUTPATIENT)
Dept: NEUROLOGY | Facility: CLINIC | Age: 48
End: 2018-07-31

## 2018-07-31 NOTE — LETTER
2018       RE: Kip Dailey  : 1970   MRN: [id number]      To: Lavonne Story, EMS     Kip Dailey is a patient under my care for treatment of seizures.     Kip has been cleared to return to work.    Sincerely,              Flaquita Tam MD      Copy mailed to home address    /[facility]

## 2018-07-31 NOTE — TELEPHONE ENCOUNTER
Discussed with Dr. Mohan, Kip has clearance to return to work. Letter prepared and routed to MD for signature

## 2019-01-10 ENCOUNTER — OFFICE VISIT (OUTPATIENT)
Dept: NEUROLOGY | Facility: CLINIC | Age: 49
End: 2019-01-10
Payer: COMMERCIAL

## 2019-01-10 VITALS
HEART RATE: 75 BPM | SYSTOLIC BLOOD PRESSURE: 110 MMHG | BODY MASS INDEX: 36.07 KG/M2 | TEMPERATURE: 97.6 F | DIASTOLIC BLOOD PRESSURE: 76 MMHG | WEIGHT: 299 LBS

## 2019-01-10 DIAGNOSIS — G40.109 FOCAL EPILEPSY (H): Primary | ICD-10-CM

## 2019-01-10 ASSESSMENT — PAIN SCALES - GENERAL: PAINLEVEL: MILD PAIN (2)

## 2019-01-10 NOTE — LETTER
"1/10/2019       RE: Kip Dailey  : 1970   MRN: [id number]      Dear Colleague,    Thank you for referring your patient, Kip Dailey, to the [facility] EPILEPSY CARE at [facility]. Please see a copy of my visit note below.    [facility] [place] [facility]      Patient:  Kip Dailey  :  1970   Age:  48 year old   Today's Office Visit:  1/10/2019    Epilepsy Data:    Patient History  Primary Epileptologist/Provider: Flaquita Tam M.D.  Patient Status: Controlled  Epilepsy Syndrome: Parietal lobe epilepsy  Epilepsy Syndrome Status: Final  Etiology  : Malformation of Cortical Development     Tests/Surgery History  Last EE12  Last MRI: 08  Last Neuropsych Testin08    Seizure Record  Current Visit Date: 01/10/19  Previous Visit Date: 18  Months since last visit: 5.75  Seizure Type 1: Simple partial seizures with sensory symptoms  Seizure Type 2: Simple partial seizures with motor signs  Seizure Type 3: Complex partial seizures unspecified  Seizure Type 4: Partial seizures with secondary generalization  -  with complex partial seizures evolving to generalized seizures    History of Present Illness:     Kip is here for a follow up on his seizures. He had a couple auras with \"feeling something is going to happen\" and one episode with tingling in his left arm which lasted few seconds, but t didn't go any further. Eversince he added the mid-day lamotrigine, he did not have any seizures with NAE. He is taking -200-500 and levetiracetam 1500 mg tid. He denies side effects including dizziness, imbalance, double vision and mood changes.     Hodgkin's lymphoma: he is following up at [place]. The trial study with the new medication has finished on 2018. His next scan would be in April to evaluate for recurrence.       Current Outpatient Medications   Medication Sig Dispense Refill     lamoTRIgine (LAMICTAL) 100 MG tablet TAKE " FOUR AND ONE-HALF TABLETS (450MG) BY MOUTH AM and 2 TABS AT NOON AND FIVE TABLETS (500 MG) HS. 345 tablet 11     levETIRAcetam (KEPPRA) 500 MG tablet TAKE THREE TABLETS BY MOUTH THREE TIMES DAILY 270 tablet 11     LORazepam (ATIVAN PO) Take 0.5 mg by mouth       enoxaparin (LOVENOX) 100 MG/ML SOLN Inject 175 mg Subcutaneous every 12 hours        Results for IRLANDA BARAJAS (MRN [id number]) as of 1/10/2019 10:49   Ref. Range 7/19/2018 09:58   Keppra (Levetiracetam) Level Latest Ref Range: 12 - 46 ug/mL 45   Lamotrigine Level Latest Ref Range: 2.5 - 15.0 ug/mL 12.7     Medication Notes:        AED Medication Compliance:  compliant most of the time  Using a pill box:  Yes    Review of Systems:  Lethargy / Tiredness:  No  Nausea / Vomiting:  No  Double Vision:  No  Sleepiness:  No  Depression:  No  Slowed Cognitive Function:  No  Memory Problems:  Yes, stable  Poor Balance:  No  Dizziness:  No  Appetite Changes:  No  Blurred Vision:  No  Sleep Changes:  No  Behavioral Changes:  No  Skin: negative  Respiratory: No shortness of breath and No cough  Cardiovascular: negative  Have you experienced a traumatic fall since your last visit: he slipped on the ice in front of his home while trying to salt the ice. Didn't have injury.     Other Issues:    Is patient safe to drive:  Yes    Exam:    /76 (BP Location: Right arm, Patient Position: Chair, Cuff Size: Adult Regular)   Pulse 75   Temp 97.6  F (36.4  C)   Wt 299 lb (135.6 kg)   BMI 36.07 kg/m        Wt Readings from Last 5 Encounters:   01/10/19 299 lb (135.6 kg)   07/19/18 316 lb 9.6 oz (143.6 kg)   08/17/17 292 lb 6.4 oz (132.6 kg)   08/16/16 300 lb (136.1 kg)   09/18/15 294 lb 9.6 oz (133.6 kg)     General appearance: Alert, awake, very pleasant, and cooperative, NAD  Gait: Wide-based gait due to body habitus.   Language/Speech: No aphasia or dysarthria.   Extraocular movements intact. No nystagmus.   Coordination: normal FNF  Face is symmetric.   Tongue is  midline.   Limb strength 5/5 bilaterally with no drift and normal tone.    Assessment and Plan:     Focal epilepsy: seizures are controlled on the current dose of lamotrigine and levetiracetam. He denies side effects. In the last visit, he had some weight gait. We discussed low sugar diet and he has lost about 17 lbs.     - Continue lamotrigine and levetiracetam as before  - RTC in 6 months        As described above, I met with the patient for 20 minutes and during this time counseling was within 50% of the visit time.  Flaquita Tam MD                    Again, thank you for allowing me to participate in the care of your patient.      Sincerely,    Flaquita Tam MD

## 2019-01-10 NOTE — PROGRESS NOTES
"[facility] [place]/[facility] Progress Note      Patient:  Kip Dailey  :  1970   Age:  48 year old   Today's Office Visit:  1/10/2019    Epilepsy Data:    Patient History  Primary Epileptologist/Provider: Flaquita Tam M.D.  Patient Status: Controlled  Epilepsy Syndrome: Parietal lobe epilepsy  Epilepsy Syndrome Status: Final  Etiology  : Malformation of Cortical Development     Tests/Surgery History  Last EE12  Last MRI: 08  Last Neuropsych Testin08    Seizure Record  Current Visit Date: 01/10/19  Previous Visit Date: 18  Months since last visit: 5.75  Seizure Type 1: Simple partial seizures with sensory symptoms  Seizure Type 2: Simple partial seizures with motor signs  Seizure Type 3: Complex partial seizures unspecified  Seizure Type 4: Partial seizures with secondary generalization  -  with complex partial seizures evolving to generalized seizures    History of Present Illness:     Kip is here for a follow up on his seizures. He had a couple auras with \"feeling something is going to happen\" and one episode with tingling in his left arm which lasted few seconds, but t didn't go any further. Eversince he added the mid-day lamotrigine, he did not have any seizures with NAE. He is taking -200-500 and levetiracetam 1500 mg tid. He denies side effects including dizziness, imbalance, double vision and mood changes.     Hodgkin's lymphoma: he is following up at [place]. The trial study with the new medication has finished on 2018. His next scan would be in April to evaluate for recurrence.       Current Outpatient Medications   Medication Sig Dispense Refill     lamoTRIgine (LAMICTAL) 100 MG tablet TAKE FOUR AND ONE-HALF TABLETS (450MG) BY MOUTH AM and 2 TABS AT NOON AND FIVE TABLETS (500 MG) HS. 345 tablet 11     levETIRAcetam (KEPPRA) 500 MG tablet TAKE THREE TABLETS BY MOUTH THREE TIMES DAILY 270 tablet 11     LORazepam (ATIVAN PO) Take 0.5 mg by mouth       " enoxaparin (LOVENOX) 100 MG/ML SOLN Inject 175 mg Subcutaneous every 12 hours        Results for IRLANDA BARAJAS (MRN [id number]) as of 1/10/2019 10:49   Ref. Range 7/19/2018 09:58   Keppra (Levetiracetam) Level Latest Ref Range: 12 - 46 ug/mL 45   Lamotrigine Level Latest Ref Range: 2.5 - 15.0 ug/mL 12.7     Medication Notes:        AED Medication Compliance:  compliant most of the time  Using a pill box:  Yes    Review of Systems:  Lethargy / Tiredness:  No  Nausea / Vomiting:  No  Double Vision:  No  Sleepiness:  No  Depression:  No  Slowed Cognitive Function:  No  Memory Problems:  Yes, stable  Poor Balance:  No  Dizziness:  No  Appetite Changes:  No  Blurred Vision:  No  Sleep Changes:  No  Behavioral Changes:  No  Skin: negative  Respiratory: No shortness of breath and No cough  Cardiovascular: negative  Have you experienced a traumatic fall since your last visit: he slipped on the ice in front of his home while trying to salt the ice. Didn't have injury.     Other Issues:    Is patient safe to drive:  Yes    Exam:    /76 (BP Location: Right arm, Patient Position: Chair, Cuff Size: Adult Regular)   Pulse 75   Temp 97.6  F (36.4  C)   Wt 299 lb (135.6 kg)   BMI 36.07 kg/m       Wt Readings from Last 5 Encounters:   01/10/19 299 lb (135.6 kg)   07/19/18 316 lb 9.6 oz (143.6 kg)   08/17/17 292 lb 6.4 oz (132.6 kg)   08/16/16 300 lb (136.1 kg)   09/18/15 294 lb 9.6 oz (133.6 kg)     General appearance: Alert, awake, very pleasant, and cooperative, NAD  Gait: Wide-based gait due to body habitus.   Language/Speech: No aphasia or dysarthria.   Extraocular movements intact. No nystagmus.   Coordination: normal FNF  Face is symmetric.   Tongue is midline.   Limb strength 5/5 bilaterally with no drift and normal tone.    Assessment and Plan:     Focal epilepsy: seizures are controlled on the current dose of lamotrigine and levetiracetam. He denies side effects. In the last visit, he had some weight gait. We  discussed low sugar diet and he has lost about 17 lbs.     - Continue lamotrigine and levetiracetam as before  - RTC in 6 months        As described above, I met with the patient for 20 minutes and during this time counseling was within 50% of the visit time.  Flaquita Tam MD

## 2019-01-23 ENCOUNTER — TELEPHONE (OUTPATIENT)
Dept: NEUROLOGY | Facility: CLINIC | Age: 49
End: 2019-01-23

## 2019-01-23 NOTE — TELEPHONE ENCOUNTER
Form received via mail on 1/23/19. Form placed in nurse folder for completion.     DMV form completed. Forwarded to MD for approval and signature

## 2019-04-12 ENCOUNTER — TELEPHONE (OUTPATIENT)
Dept: NEUROLOGY | Facility: CLINIC | Age: 49
End: 2019-04-12

## 2019-04-18 ENCOUNTER — TELEPHONE (OUTPATIENT)
Dept: NEUROLOGY | Facility: CLINIC | Age: 49
End: 2019-04-18

## 2019-04-19 NOTE — TELEPHONE ENCOUNTER
DMV form signed, faxed to [employer] on 04/18/2019, sent to scanning, and copy mailed to patient. I spoke to the patient and let him know that he could come and get a copy to take to the DMV but he lives in [place] so he stated he will wait for the paper copy and bring that to the DMV when it arrives at his house.

## 2019-07-11 ENCOUNTER — OFFICE VISIT (OUTPATIENT)
Dept: NEUROLOGY | Facility: CLINIC | Age: 49
End: 2019-07-11
Payer: COMMERCIAL

## 2019-07-11 VITALS
WEIGHT: 288.8 LBS | SYSTOLIC BLOOD PRESSURE: 111 MMHG | DIASTOLIC BLOOD PRESSURE: 73 MMHG | HEART RATE: 71 BPM | BODY MASS INDEX: 34.84 KG/M2

## 2019-07-11 DIAGNOSIS — G40.109 LOCALIZATION-RELATED EPILEPSY (H): ICD-10-CM

## 2019-07-11 DIAGNOSIS — G40.209 LOCALIZATION-RELATED PARTIAL EPILEPSY WITH COMPLEX PARTIAL SEIZURES (H): ICD-10-CM

## 2019-07-11 DIAGNOSIS — G40.109 PARTIAL SEIZURE DISORDER (H): ICD-10-CM

## 2019-07-11 RX ORDER — LEVETIRACETAM 500 MG/1
TABLET ORAL
Qty: 270 TABLET | Refills: 11 | Status: SHIPPED | OUTPATIENT
Start: 2019-07-11 | End: 2020-07-16

## 2019-07-11 RX ORDER — LAMOTRIGINE 100 MG/1
TABLET ORAL
Qty: 345 TABLET | Refills: 11 | Status: SHIPPED | OUTPATIENT
Start: 2019-07-11 | End: 2019-07-17

## 2019-07-11 ASSESSMENT — PAIN SCALES - GENERAL: PAINLEVEL: NO PAIN (0)

## 2019-07-11 NOTE — PROGRESS NOTES
[facility] Progress Note      Patient:  Kip Dailey  :  1970   Age:  49 year old   Today's Office Visit:  2019    Epilepsy Data:    Patient History  Primary Epileptologist/Provider: Flaquita Tam M.D. 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     Tests/Surgery History  Last EEG: (P) 12  Last MRI: (P) 08  Last Neuropsych Testing: (P) 08    Seizure Record  Current Visit Date: (P) 19  Previous Visit Date: (P) 01/10/19  Months since last visit: (P) 5.98  Seizure Type 1: (P) Simple partial seizures with sensory symptoms  Seizure Type 2: (P) Simple partial seizures with motor signs  Seizure Type 3: (P) Complex partial seizures unspecified  Seizure Type 4: (P) Partial seizures with secondary generalization  -  with complex partial seizures evolving to generalized seizures    History of Present Illness:     Kip is here for a follow up on his seizures. He had couple moments which he felt he was going to have a seizure, but this did not progress to any more severe symptoms. He denies any seizures with LOC. He is taking -200-500 and LVT [id number]. He has some imbalance, some of it is due to arthritis and body habitus. Medication levels not supratherapeutic.     Current Outpatient Medications   Medication Sig Dispense Refill     lamoTRIgine (LAMICTAL) 100 MG tablet TAKE FOUR AND ONE-HALF TABLETS (450MG) BY MOUTH AM and 2 TABS AT NOON AND FIVE TABLETS (500 MG) HS. 345 tablet 11     levETIRAcetam (KEPPRA) 500 MG tablet TAKE THREE TABLETS BY MOUTH THREE TIMES DAILY 270 tablet 11     LORazepam (ATIVAN PO) Take 0.5 mg by mouth       enoxaparin (LOVENOX) 100 MG/ML SOLN Inject 175 mg Subcutaneous every 12 hours        Medication Notes:        AED Medication Compliance:  compliant most of the time  Using a pill box:  Yes    Review of  Systems:  Lethargy / Tiredness:  Yes, tired after work  Nausea / Vomiting:  No  Double Vision:  No  Sleepiness:  No  Depression:  No  Slowed Cognitive Function:  No  Memory Problems:  Yes  Poor Balance:  Some, stable  Dizziness:  No  Appetite Changes:  No  Blurred Vision:  No  Sleep Changes:  No  Behavioral Changes:  No  Skin: negative  Respiratory: No shortness of breath and No cough  Cardiovascular: negative  Have you experienced a traumatic fall since your last visit: NO    Other Issues:    Is patient safe to drive:  Yes    Exam:    /73   Pulse 71   Wt 288 lb 12.8 oz (131 kg)   BMI 34.84 kg/m       Wt Readings from Last 5 Encounters:   07/11/19 288 lb 12.8 oz (131 kg)   01/10/19 299 lb (135.6 kg)   07/19/18 316 lb 9.6 oz (143.6 kg)   08/17/17 292 lb 6.4 oz (132.6 kg)   08/16/16 300 lb (136.1 kg)     General appearance: Alert, awake, very pleasant, and cooperative, NAD  Gait: Wide-based gait due to body habitus.   Language/Speech: No aphasia or dysarthria.   Extraocular movements intact. No nystagmus.   Coordination: normal FNF  Face is symmetric.   Tongue is midline.   Limb strength 5/5 bilaterally with no drift and normal tone.     Assessment and Plan:     Focal epilepsy: focal impaired-aware seizures are controlled on the current dose of lamotrigine and levetiracetam. He had couple events which he felt he was going to have a seizure, but it did not progress further. He denies side effects. He had approximately 10 lbs WL compared to last visit, 6 months ago. Has some imbalance which has been stable and is somewhat due to arthritis and body habitus. I will check LMT level for toxicity.     - Continue lamotrigine and LVT  - Obtain AED levels   - RTC in 6 months        As described above, I met with the patient for 15 minutes and during this time counseling was greater than 50% of the visit time.  Flaquita Tam MD

## 2019-07-11 NOTE — NURSING NOTE
Lab time drawn: 11:45 am    Last dose of med taken: 7 :00 am today.    Willa Griffin,CMA   25-May-2019

## 2019-07-11 NOTE — LETTER
2019       RE: Kip Dailey  : 1970   MRN: [id number]      Dear Colleague,    Thank you for referring your patient, Kip Dailey, to the [facility] EPILEPSY CARE at [facility]. Please see a copy of my visit note below.    [facility] [place]      Patient:  Kip Dailey  :  1970   Age:  49 year old   Today's Office Visit:  2019    Epilepsy Data:    Patient History  Primary Epileptologist/Provider: (AL[place]) Flaquita Tam M.D.  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     Tests/Surgery History  Last EEG: (P) 12  Last MRI: (P) 08  Last Neuropsych Testing: (P) 08    Seizure Record  Current Visit Date: (P) 19  Previous Visit Date: (P) 01/10/19  Months since last visit: (P) 5.98  Seizure Type 1: (P) Simple partial seizures with sensory symptoms  Seizure Type 2: (P) Simple partial seizures with motor signs  Seizure Type 3: (P) Complex partial seizures unspecified  Seizure Type 4: (P) Partial seizures with secondary generalization  -  with complex partial seizures evolving to generalized seizures    History of Present Illness:     Kip is here for a follow up on his seizures. He had couple moments which he felt he was going to have a seizure, but this did not progress to any more severe symptoms. He denies any seizures with LOC. He is taking -200-500 and LVT [id number]. He has some imbalance, some of it is due to arthritis and body habitus. Medication levels not supratherapeutic.     Current Outpatient Medications   Medication Sig Dispense Refill     lamoTRIgine (LAMICTAL) 100 MG tablet TAKE FOUR AND ONE-HALF TABLETS (450MG) BY MOUTH AM and 2 TABS AT NOON AND FIVE TABLETS (500 MG) HS. 345 tablet 11     levETIRAcetam (KEPPRA) 500 MG tablet TAKE THREE TABLETS BY MOUTH THREE TIMES DAILY 270  tablet 11     LORazepam (ATIVAN PO) Take 0.5 mg by mouth       enoxaparin (LOVENOX) 100 MG/ML SOLN Inject 175 mg Subcutaneous every 12 hours        Medication Notes:        AED Medication Compliance:  compliant most of the time  Using a pill box:  Yes    Review of Systems:  Lethargy / Tiredness:  Yes, tired after work  Nausea / Vomiting:  No  Double Vision:  No  Sleepiness:  No  Depression:  No  Slowed Cognitive Function:  No  Memory Problems:  Yes  Poor Balance:  Some, stable  Dizziness:  No  Appetite Changes:  No  Blurred Vision:  No  Sleep Changes:  No  Behavioral Changes:  No  Skin: negative  Respiratory: No shortness of breath and No cough  Cardiovascular: negative  Have you experienced a traumatic fall since your last visit: NO    Other Issues:    Is patient safe to drive:  Yes    Exam:    /73   Pulse 71   Wt 288 lb 12.8 oz (131 kg)   BMI 34.84 kg/m        Wt Readings from Last 5 Encounters:   07/11/19 288 lb 12.8 oz (131 kg)   01/10/19 299 lb (135.6 kg)   07/19/18 316 lb 9.6 oz (143.6 kg)   08/17/17 292 lb 6.4 oz (132.6 kg)   08/16/16 300 lb (136.1 kg)     General appearance: Alert, awake, very pleasant, and cooperative, NAD  Gait: Wide-based gait due to body habitus.   Language/Speech: No aphasia or dysarthria.   Extraocular movements intact. No nystagmus.   Coordination: normal FNF  Face is symmetric.   Tongue is midline.   Limb strength 5/5 bilaterally with no drift and normal tone.     Assessment and Plan:     Focal epilepsy: focal impaired-aware seizures are controlled on the current dose of lamotrigine and levetiracetam. He had couple events which he felt he was going to have a seizure, but it did not progress further. He denies side effects. He had approximately 10 lbs WL compared to last visit, 6 months ago. Has some imbalance which has been stable and is somewhat due to arthritis and body habitus. I will check LMT level for toxicity.     - Continue lamotrigine and LVT  - Obtain AED levels    - RTC in 6 months        As described above, I met with the patient for 15 minutes and during this time counseling was greater than 50% of the visit time.  Flaquita Tam MD                    Again, thank you for allowing me to participate in the care of your patient.      Sincerely,    Flaquita Tam MD

## 2019-07-13 LAB
LAMOTRIGINE SERPL-MCNC: 15.7 UG/ML (ref 2.5–15)
LEVETIRACETAM SERPL-MCNC: 31 UG/ML (ref 12–46)

## 2019-07-17 DIAGNOSIS — G40.109 LOCALIZATION-RELATED EPILEPSY (H): ICD-10-CM

## 2019-07-17 DIAGNOSIS — G40.109 PARTIAL SEIZURE DISORDER (H): ICD-10-CM

## 2019-07-17 RX ORDER — LAMOTRIGINE 100 MG/1
TABLET ORAL
Qty: 330 TABLET | Refills: 11 | Status: SHIPPED | OUTPATIENT
Start: 2019-07-17 | End: 2020-01-16

## 2019-11-19 ENCOUNTER — TELEPHONE (OUTPATIENT)
Dept: NEUROLOGY | Facility: CLINIC | Age: 49
End: 2019-11-19

## 2019-11-19 DIAGNOSIS — G40.109 LOCALIZATION-RELATED EPILEPSY (H): Primary | ICD-10-CM

## 2019-11-19 NOTE — TELEPHONE ENCOUNTER
What is the concern that needs to be addressed by a nurse? A few breakthrough seizures on Saturday, all lasting less than one minute.  Patient self increased Lamictal noon dose increase from 200 to 300 mg, and the 7 PM dose increased to 500 mg.  Yesterday had five seizures lasting no more than 1 minute.  All seizures were roughly two hours apart both yesterday and Saturday.  Patient would like to know if he should have a lab draw.    May a detailed message be left on voicemail? Yes    Date of last office visit: 7/11/19    Message routed to: [facility] RN Pool

## 2019-11-19 NOTE — TELEPHONE ENCOUNTER
Writer spoke with Kip who reports he had a seizure which lasted for 1 minute or less. He was sitting down during the seizure, no injury.    Due to having had a seizure, Kip reports that he increased his medication.     Kip reports he has increased Medication as follows:    Lamotrigine: 200 - 300 - 500 (different than what is reflected in patient chart)  Currently taking Lamotrigine 450-300-500    Keppra: 1500 - 1500  Current Keppra dose: [id number]    Most recent lab level drawn 10/23/19 (Lamotrigine) returned at 14.7.    Kip reports that he's had two seizures today. He experiences an aura lasting less than 30 seconds, he sits down, his left arm becomes numb, his head will often turn to the left, the seizure quickly resolves.

## 2019-11-19 NOTE — TELEPHONE ENCOUNTER
Writer spoke with Kip who reports he had a seizure which lasted for 1 minute or less. He was sitting down during the seizure, no injury.    Due to having had a seizure, Kip reports that he increased his medication.     Kip reports he has increased Medication as follows:    Lamotrigine: 200 - 300 - 500 (different than what is reflected in patient chart)  Keppra: 1500 - 1500    Most recent lab level drawn 10/23/19 (Lamotrigine) returned at 14.7.

## 2020-01-16 ENCOUNTER — OFFICE VISIT (OUTPATIENT)
Dept: NEUROLOGY | Facility: CLINIC | Age: 50
End: 2020-01-16
Payer: COMMERCIAL

## 2020-01-16 VITALS
HEART RATE: 73 BPM | DIASTOLIC BLOOD PRESSURE: 88 MMHG | BODY MASS INDEX: 35.59 KG/M2 | SYSTOLIC BLOOD PRESSURE: 131 MMHG | WEIGHT: 295 LBS

## 2020-01-16 DIAGNOSIS — G40.109 LOCALIZATION-RELATED EPILEPSY (H): ICD-10-CM

## 2020-01-16 DIAGNOSIS — G40.109 PARTIAL SEIZURE DISORDER (H): ICD-10-CM

## 2020-01-16 RX ORDER — LAMOTRIGINE 100 MG/1
TABLET ORAL
Qty: 360 TABLET | Refills: 11 | Status: SHIPPED | OUTPATIENT
Start: 2020-01-16 | End: 2020-01-22

## 2020-01-16 ASSESSMENT — PAIN SCALES - GENERAL: PAINLEVEL: NO PAIN (0)

## 2020-01-16 NOTE — PROGRESS NOTES
[facility]      Patient:  Kip Dailey  :  1970   Age:  49 year old   Today's Office Visit:  2020    Epilepsy Data:    Patient History  Primary Epileptologist/Provider: Flaquita Tam M.D.  Patient Status: Controlled  Epilepsy Syndrome: Parietal lobe epilepsy  Epilepsy Syndrome Status: Final  Etiology  : Malformation of Cortical Development     Tests/Surgery History  Last EE12  Last MRI: 08  Last Neuropsych Testin08    Seizure Record  Current Visit Date: 20  Previous Visit Date: 19  Months since last visit:   Seizure Type 1: Simple partial seizures with sensory symptoms  Seizure Type 2: Simple partial seizures with motor signs  Seizure Type 3: Complex partial seizures unspecified  Seizure Type 4: Partial seizures with secondary generalization  -  with complex partial seizures evolving to generalized seizures    History of Present Illness:     The patient is here for follow-up on his seizures.  In November, he was working at [employer] [place] an aura started with lightheadedness, felt something was going to happen, he nailed down on one knee. His left arm went numb. A flaquita went to his coworker, and said look like he is having a seizure.  It lasted 1.5-2 minutes. He was fine when someone came to check on him. He did not have loss of consciousness. With his recent seizures, he has been always aware of his surrounding. His focal impaired aware seizures are controlled. He had 2 other focal unimpaired aware seizures which started with aura and then left arm numbness.   He increased his lamotrigine after that episode by 100 mg at noon. He did well since then , no seizures. He denies dizziness, imbalance, double/blurred vision.    He is taking -300-500 and LVT [id number].     Current Outpatient Medications   Medication Sig Dispense Refill     Docusate Sodium (COLACE PO) Take by mouth daily Take 1 am and 1 pm       lamoTRIgine  (LAMICTAL) 100 MG tablet TAKE 4 TABLETS (400MG) BY MOUTH AM and 2 TABS (200) AT NOON AND 5 TABLETS (500 MG) HS. (Patient taking differently: TAKE 4 1/2TABLETS (450mg) BY MOUTH AM and 3 TABS (300) AT NOON AND 4 1/2 TABLETS (450 MG) HS.) 330 tablet 11     levETIRAcetam (KEPPRA) 500 MG tablet TAKE THREE TABLETS BY MOUTH THREE TIMES DAILY 270 tablet 11     enoxaparin (LOVENOX) 100 MG/ML SOLN Inject 175 mg Subcutaneous every 12 hours       LORazepam (ATIVAN PO) Take 0.5 mg by mouth        Results for IRLANDA BARAJAS (MRN [id number]) as of 1/22/2020 12:57   Ref. Range 7/11/2019 11:45   Keppra (Levetiracetam) Level Latest Ref Range: 12 - 46 ug/mL 31   Lamotrigine Level Latest Ref Range: 2.5 - 15.0 ug/mL 15.7 (H)       Exam:    /88 (BP Location: Right arm, Patient Position: Sitting, Cuff Size: Adult Regular)   Pulse 73   Wt 295 lb (133.8 kg)   BMI 35.59 kg/m       Wt Readings from Last 5 Encounters:   01/16/20 295 lb (133.8 kg)   07/11/19 288 lb 12.8 oz (131 kg)   01/10/19 299 lb (135.6 kg)   07/19/18 316 lb 9.6 oz (143.6 kg)   08/17/17 292 lb 6.4 oz (132.6 kg)     General appearance: Alert, awake, very pleasant, and cooperative, NAD  Gait: Wide-based gait due to body habitus.   Language/Speech: No aphasia or dysarthria.   Extraocular movements intact. No nystagmus.   Coordination: normal FNF  Face is symmetric.   Tongue is midline.   Limb strength 5/5 bilaterally with no drift and normal tone.    Assessment and Plan:    Focal epilepsy: focal impaired-aware seizures are controlled on the current dose of lamotrigine and levetiracetam. He had 3 focal unimpaired aware seizures and he increased his LMT by 100 mg at noon. He has been doing well since, with no seizures or side effects.    - Continue -300-500 and LVT [id number]  - RT in 6 months      As described above, I met with the patient for 20 minutes and during this time counseling was greater than 50% of the visit time.  Flaquita Tam,  MD

## 2020-01-16 NOTE — LETTER
2020       RE: Kip Dailey  : 1970   MRN: [id number]      Dear Colleague,    Thank you for referring your patient, Kip Dailey, to the [facility] EPILEPSY CARE at [facility]. Please see a copy of my visit note below.    [facility]      Patient:  Kip Dailey  :  1970   Age:  49 year old   Today's Office Visit:  2020    Epilepsy Data:    Patient History  Primary Epileptologist/Provider: Flaquita Tam M.D.  Patient Status: Controlled  Epilepsy Syndrome: Parietal lobe epilepsy  Epilepsy Syndrome Status: Final  Etiology  : Malformation of Cortical Development     Tests/Surgery History  Last EE12  Last MRI: 08  Last Neuropsych Testin08    Seizure Record  Current Visit Date: 20  Previous Visit Date: 19  Months since last visit: 6.21  Seizure Type 1: Simple partial seizures with sensory symptoms  Seizure Type 2: Simple partial seizures with motor signs  Seizure Type 3: Complex partial seizures unspecified  Seizure Type 4: Partial seizures with secondary generalization  -  with complex partial seizures evolving to generalized seizures    History of Present Illness:     The patient is here for follow-up on his seizures.  In November, he was working at [employer] [place] an aura started with lightheadedness, felt something was going to happen, he nailed down on one knee. His left arm went numb. A  went to his coworker, and said look like he is having a seizure.  It lasted 1.5-2 minutes. He was fine when someone came to check on him. He did not have loss of consciousness. With his recent seizures, he has been always aware of his surrounding. His focal impaired aware seizures are controlled. He had 2 other focal unimpaired aware seizures which started with aura and then left arm numbness.   He increased his lamotrigine after that episode by 100 mg at noon. He did well since then , no seizures. He  denies dizziness, imbalance, double/blurred vision.    He is taking -300-500 and LVT [id number].     Current Outpatient Medications   Medication Sig Dispense Refill     Docusate Sodium (COLACE PO) Take by mouth daily Take 1 am and 1 pm       lamoTRIgine (LAMICTAL) 100 MG tablet TAKE 4 TABLETS (400MG) BY MOUTH AM and 2 TABS (200) AT NOON AND 5 TABLETS (500 MG) HS. (Patient taking differently: TAKE 4 1/2TABLETS (450mg) BY MOUTH AM and 3 TABS (300) AT NOON AND 4 1/2 TABLETS (450 MG) HS.) 330 tablet 11     levETIRAcetam (KEPPRA) 500 MG tablet TAKE THREE TABLETS BY MOUTH THREE TIMES DAILY 270 tablet 11     enoxaparin (LOVENOX) 100 MG/ML SOLN Inject 175 mg Subcutaneous every 12 hours       LORazepam (ATIVAN PO) Take 0.5 mg by mouth        Results for IRLANDA BARAJAS (MRN [id number]) as of 1/22/2020 12:57   Ref. Range 7/11/2019 11:45   Keppra (Levetiracetam) Level Latest Ref Range: 12 - 46 ug/mL 31   Lamotrigine Level Latest Ref Range: 2.5 - 15.0 ug/mL 15.7 (H)       Exam:    /88 (BP Location: Right arm, Patient Position: Sitting, Cuff Size: Adult Regular)   Pulse 73   Wt 295 lb (133.8 kg)   BMI 35.59 kg/m        Wt Readings from Last 5 Encounters:   01/16/20 295 lb (133.8 kg)   07/11/19 288 lb 12.8 oz (131 kg)   01/10/19 299 lb (135.6 kg)   07/19/18 316 lb 9.6 oz (143.6 kg)   08/17/17 292 lb 6.4 oz (132.6 kg)     General appearance: Alert, awake, very pleasant, and cooperative, NAD  Gait: Wide-based gait due to body habitus.   Language/Speech: No aphasia or dysarthria.   Extraocular movements intact. No nystagmus.   Coordination: normal FNF  Face is symmetric.   Tongue is midline.   Limb strength 5/5 bilaterally with no drift and normal tone.    Assessment and Plan:    Focal epilepsy: focal impaired-aware seizures are controlled on the current dose of lamotrigine and levetiracetam.  He had 3 focal unimpaired aware seizures and he increased his LMT by 100 mg at noon. He has been doing well since, with no  seizures or side effects.    - Continue -300-500 and LVT [id number]  - RTC in 6 months      As described above, I met with the patient for 20 minutes and during this time counseling was greater than 50% of the visit time.  Flaquita Tam MD                    Again, thank you for allowing me to participate in the care of your patient.      Sincerely,    Flaquita Tam MD

## 2020-01-22 RX ORDER — LAMOTRIGINE 100 MG/1
TABLET ORAL
Qty: 375 TABLET | Refills: 11 | Status: SHIPPED | OUTPATIENT
Start: 2020-01-22 | End: 2020-07-16

## 2020-01-27 ENCOUNTER — TELEPHONE (OUTPATIENT)
Dept: NEUROLOGY | Facility: CLINIC | Age: 50
End: 2020-01-27

## 2020-01-31 NOTE — TELEPHONE ENCOUNTER
Forms received are in regards to the patient's employability status. They request the forms to be filled out and a letter stating further information regarding on the MD opinion. Forms prepared, letter drafted; both signed by MD and faxed back to the requestor. A copy was faxed to medical records as well as back to [facility] main clinic to be mailed to the patient.

## 2020-04-28 ENCOUNTER — TELEPHONE (OUTPATIENT)
Dept: NEUROLOGY | Facility: CLINIC | Age: 50
End: 2020-04-28

## 2020-04-29 NOTE — TELEPHONE ENCOUNTER
DMV form completed and fax and mailed to dmv,copy mailed to patient ,fax to [facility] and placed in folder.

## 2020-07-16 ENCOUNTER — VIRTUAL VISIT (OUTPATIENT)
Dept: NEUROLOGY | Facility: CLINIC | Age: 50
End: 2020-07-16
Payer: COMMERCIAL

## 2020-07-16 DIAGNOSIS — G40.109 PARTIAL SEIZURE DISORDER (H): ICD-10-CM

## 2020-07-16 DIAGNOSIS — G40.209 LOCALIZATION-RELATED PARTIAL EPILEPSY WITH COMPLEX PARTIAL SEIZURES (H): ICD-10-CM

## 2020-07-16 DIAGNOSIS — G40.109 LOCALIZATION-RELATED EPILEPSY (H): ICD-10-CM

## 2020-07-16 RX ORDER — LEVETIRACETAM 500 MG/1
TABLET ORAL
Qty: 270 TABLET | Refills: 11 | Status: SHIPPED | OUTPATIENT
Start: 2020-07-16 | End: 2021-07-08

## 2020-07-16 RX ORDER — LAMOTRIGINE 100 MG/1
TABLET ORAL
Qty: 375 TABLET | Refills: 11 | Status: SHIPPED | OUTPATIENT
Start: 2020-07-16 | End: 2021-01-04

## 2020-07-16 NOTE — PROGRESS NOTES
" Kip Dailey is a 50 year old male who is being evaluated via a billable video visit.      The patient has been notified of following:     \"This video visit will be conducted via a call between you and your physician/provider. We have found that certain health care needs can be provided without the need for an in-person physical exam.  This service lets us provide the care you need with a video conversation.  If a prescription is necessary we can send it directly to your pharmacy.  If lab work is needed we can place an order for that and you can then stop by our lab to have the test done at a later time.    Video visits are billed at different rates depending on your insurance coverage.  Please reach out to your insurance provider with any questions.    If during the course of the call the physician/provider feels a video visit is not appropriate, you will not be charged for this service.\"    Patient has given verbal consent for Video visit? Yes  How would you like to obtain your AVS? Tagoodies  If you are dropped from the video visit, the video invite should be resent to: Send to e-mail at:[place] Will anyone else be joining your video visit? Yes: wife. How would they like to receive their invitation? Send to e-mail at:[place]       Video-Visit Details    Type of service:  Video Visit    Video Start Time: 11:05  Video End Time: 11:18    Originating Location (pt. Location): Home    Distant Location (provider location):  [facility] EPILEPSY CARE     Platform used for Video Visit: [facility] Epilepsy Care Progress Note      Patient:  Kip Dailey  :  1970   Age:  50 year old   Today's virtual Visit:  2020    Epilepsy Data:    Patient History  Primary Epileptologist/Provider: (P) Flaquita Tam M.D.  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     Tests/Surgery History  Last " EEG: (P) 11/2/12  Last MRI: (P) 6/23/08  Last Neuropsych Testing: (P) 6/23/08    Seizure Record  Current Visit Date: (P) 07/16/20  Previous Visit Date: (P) 01/16/20  Months since last visit: (P) 5.98  Seizure Type 1: (P) Simple partial seizures with sensory symptoms  Seizure Type 2: (P) Simple partial seizures with motor signs  Seizure Type 3: (P) Complex partial seizures unspecified  Seizure Type 4: (P) Partial seizures with secondary generalization  -  with complex partial seizures evolving to generalized seizures    History of Present Illness:    Kip is participating in this virtual visit for follow-up on his seizures.  He was last seen in January 2020.  He did not have any seizures since last visit.  He is taking -300-450 (which was changed from 450-300-500 due to dizziness, which improved upon dose reduction) and LVT 5537-2525-9911.     He denies excessive tiredness, sleepiness, dizziness.  He has, imbalance is chronic and started during his chemotherapy.  He is cautious when he walks and has not fallen.     Other issues: he is currently not on chemotherapy for his Hodgkin's lymphoma.  He had a follow-up at [place] in June.  He was told his cancer has been in remission for 2 years.     Results for KIP BARAJAS (MRN [id number]) as of 7/16/2020 11:13   Ref. Range 7/11/2019 11:45   Keppra (Levetiracetam) Level Latest Ref Range: 12 - 46 ug/mL 31   Lamotrigine Level Latest Ref Range: 2.5 - 15.0 ug/mL 15.7 (H)     Current Outpatient Medications   Medication Sig Dispense Refill     Docusate Sodium (COLACE PO) Take by mouth daily Take 1 am and 1 pm       levETIRAcetam (KEPPRA) 500 MG tablet TAKE THREE TABLETS BY MOUTH THREE TIMES DAILY 270 tablet 11     enoxaparin (LOVENOX) 100 MG/ML SOLN Inject 175 mg Subcutaneous every 12 hours       lamoTRIgine (LAMICTAL) 100 MG tablet TAKE 4 1/2TABLETS (450mg) BY MOUTH AM and 3 TABS (300) AT NOON AND 5 TABLETS (500 MG) HS. (Patient taking differently: TAKE 4 1/2TABLETS (450mg)  BY MOUTH AM and 3 TABS (300) AT NOON AND 4 1/2 TABLETS (500 MG) HS.) 375 tablet 11     LORazepam (ATIVAN PO) Take 0.5 mg by mouth          Medication Notes:        AED Medication Compliance:  compliant most of the time    Review of Systems:  Lethargy / Tiredness:  No  Nausea / Vomiting:  No  Double Vision:  No  Sleepiness:  No  Slowed Cognitive Function:  No  Poor Balance:  Yes, for the past 3 chemo cycles, his balance has been affected and hasn't fully recovered.   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  Decreasing lamotrigine by 50 mg in the evening improved dizziness.  He has been doing well since, with no seizures or side effects.     - Continue -300-450 and LVT [id number]  - RTC in 6 months        As described above, I met with the patient via Am Well for 13 minutes and during this time counseling was greater than 50% of the visit time.  Flaquita Tam MD

## 2020-07-16 NOTE — LETTER
"2020       RE: Kip Dailey  : 1970   MRN: [id number]      Dear Colleague,    Thank you for referring your patient, Kip Dailey, to the [facility] EPILEPSY CARE at [facility]. Please see a copy of my visit note below.     Kip Dailey is a 50 year old male who is being evaluated via a billable video visit.      The patient has been notified of following:     \"This video visit will be conducted via a call between you and your physician/provider. We have found that certain health care needs can be provided without the need for an in-person physical exam.  This service lets us provide the care you need with a video conversation.  If a prescription is necessary we can send it directly to your pharmacy.  If lab work is needed we can place an order for that and you can then stop by our lab to have the test done at a later time.    Video visits are billed at different rates depending on your insurance coverage.  Please reach out to your insurance provider with any questions.    If during the course of the call the physician/provider feels a video visit is not appropriate, you will not be charged for this service.\"    Patient has given verbal consent for Video visit? Yes  How would you like to obtain your AVS? Carlotz  If you are dropped from the video visit, the video invite should be resent to: Send to e-mail at:[employer] Will anyone else be joining your video visit? Yes: wife. How would they like to receive their invitation? Send to e-mail at:[employer]       Video-Visit Details    Type of service:  Video Visit    Video Start Time: 11:05  Video End Time: 11:18    Originating Location (pt. Location): Home    Distant Location (provider location):  [facility] EPILEPSY CARE     Platform used for Video Visit: Pixelapse      [facility]      Patient:  Kip Dailey  :  1970   Age:  50 year old   Today's virtual Visit:  2020    Epilepsy Data:    Patient " History  Primary Epileptologist/Provider: (P) Flaquita Tam M.D.  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     Tests/Surgery History  Last EEG: (P) 11/2/12  Last MRI: (P) 6/23/08  Last Neuropsych Testing: (P) 6/23/08    Seizure Record  Current Visit Date: (P) 07/16/20  Previous Visit Date: (P) 01/16/20  Months since last visit: (P) 5.98  Seizure Type 1: (P) Simple partial seizures with sensory symptoms  Seizure Type 2: (P) Simple partial seizures with motor signs  Seizure Type 3: (P) Complex partial seizures unspecified  Seizure Type 4: (P) Partial seizures with secondary generalization  -  with complex partial seizures evolving to generalized seizures    History of Present Illness:    Irlanda is participating in this virtual visit for follow-up on his seizures.  He was last seen in January 2020.  He did not have any seizures since last visit.  He is taking -300-450 (which was changed from 450-300-500 due to dizziness, which improved upon dose reduction) and LVT [id number].     He denies excessive tiredness, sleepiness, dizziness.  He has, imbalance is chronic and started during his chemotherapy.  He is cautious when he walks and has not fallen.     Other issues: he is currently not on chemotherapy for his Hodgkin's lymphoma.  He had a follow-up at [place] in June.  He was told his cancer has been in remission for 2 years.     Results for IRLANDA BARAJAS (MRN [id number]) as of 7/16/2020 11:13   Ref. Range 7/11/2019 11:45   Keppra (Levetiracetam) Level Latest Ref Range: 12 - 46 ug/mL 31   Lamotrigine Level Latest Ref Range: 2.5 - 15.0 ug/mL 15.7 (H)     Current Outpatient Medications   Medication Sig Dispense Refill     Docusate Sodium (COLACE PO) Take by mouth daily Take 1 am and 1 pm       levETIRAcetam (KEPPRA) 500 MG tablet TAKE THREE TABLETS BY MOUTH THREE TIMES DAILY 270  tablet 11     enoxaparin (LOVENOX) 100 MG/ML SOLN Inject 175 mg Subcutaneous every 12 hours       lamoTRIgine (LAMICTAL) 100 MG tablet TAKE 4 1/2TABLETS (450mg) BY MOUTH AM and 3 TABS (300) AT NOON AND 5 TABLETS (500 MG) HS. (Patient taking differently: TAKE 4 1/2TABLETS (450mg) BY MOUTH AM and 3 TABS (300) AT NOON AND 4 1/2 TABLETS (500 MG) HS.) 375 tablet 11     LORazepam (ATIVAN PO) Take 0.5 mg by mouth          Medication Notes:        AED Medication Compliance:  compliant most of the time    Review of Systems:  Lethargy / Tiredness:  No  Nausea / Vomiting:  No  Double Vision:  No  Sleepiness:  No  Slowed Cognitive Function:  No  Poor Balance:  Yes, for the past 3 chemo cycles, his balance has been affected and hasn't fully recovered.   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  Decreasing lamotrigine by 50 mg in the evening improved dizziness.  He has been doing well since, with no seizures or side effects.     - Continue -300-450 and LVT [id number]  - RTC in 6 months        As described above, I met with the patient via Am Well for 13 minutes and during this time counseling was greater than 50% of the visit time.  Flaquita Tam MD                    Again, thank you for allowing me to participate in the care of your patient.      Sincerely,    Flaquita Tam MD

## 2020-07-23 ENCOUNTER — TRANSFERRED RECORDS (OUTPATIENT)
Dept: HEALTH INFORMATION MANAGEMENT | Facility: CLINIC | Age: 50
End: 2020-07-23

## 2020-07-23 LAB
KEPPRA (LEVETIRACETAM) LEVEL: 44.7 UG/ML (ref 12–46)
LAMOTRIGINE SERPL-MCNC: 18.7 MCG/ML (ref 2.5–15)

## 2020-07-31 DIAGNOSIS — G40.109 LOCALIZATION-RELATED EPILEPSY (H): ICD-10-CM

## 2020-12-11 ENCOUNTER — TELEPHONE (OUTPATIENT)
Dept: NEUROLOGY | Facility: CLINIC | Age: 50
End: 2020-12-11

## 2020-12-20 ENCOUNTER — HEALTH MAINTENANCE LETTER (OUTPATIENT)
Age: 50
End: 2020-12-20

## 2021-01-04 ENCOUNTER — VIRTUAL VISIT (OUTPATIENT)
Dept: NEUROLOGY | Facility: CLINIC | Age: 51
End: 2021-01-04
Payer: COMMERCIAL

## 2021-01-04 DIAGNOSIS — G40.109 LOCALIZATION-RELATED EPILEPSY (H): ICD-10-CM

## 2021-01-04 DIAGNOSIS — G40.109 PARTIAL SEIZURE DISORDER (H): ICD-10-CM

## 2021-01-04 RX ORDER — LAMOTRIGINE 100 MG/1
TABLET ORAL
Qty: 315 TABLET | Refills: 11 | Status: SHIPPED | OUTPATIENT
Start: 2021-01-04 | End: 2021-01-04 | Stop reason: DRUGHIGH

## 2021-01-04 RX ORDER — LAMOTRIGINE 100 MG/1
TABLET ORAL
Qty: 330 TABLET | Refills: 11 | Status: SHIPPED | OUTPATIENT
Start: 2021-01-04 | End: 2021-04-12

## 2021-01-04 SDOH — HEALTH STABILITY: MENTAL HEALTH: HOW OFTEN DO YOU HAVE A DRINK CONTAINING ALCOHOL?: NEVER

## 2021-01-04 NOTE — PROGRESS NOTES
"Kip Dailey is a 50 year old male who is being evaluated via a billable video visit.      How would you like to obtain your AVS?   If the video visit is dropped, the invitation should be resent by:   Will anyone else be joining your video visit? No      Video Start Time: 11:36    Video-Visit Details    Type of service:  Video Visit    Video End Time:11:36  Originating Location (pt. Location): Home    Distant Location (provider location):  [facility] EPILEPSY CARE     Platform used for Video Visit: [facility] Epilepsy Care Progress Note      Patient:  Kip Dailey  :  1970   Age:  50 year old   Today's virtual Visit:  2021    Epilepsy Data:    Patient History  Primary Epileptologist/Provider: Flaquita Tam M.D. 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 Kip Dailey is participating in this virtual visit for follow-up on his seizures.  He was last seen in 2020.  He did not have any seizures until last Thursday in which he had an aura.  He was sick for a couple days.  He had nasal drainage and dry cough for couple days.  He had an episode with feeling \"something was going to happen\".  It lasted only 20 to 30 seconds.  Did not progress further.    He was checked and was told it's a " common cold.   His Covid test pending.     He did cut back his LMT to 450-250-400. His is taking Keppra 1500 mg tid.  He had slight unsteadiness which improved upon decreasing lamotrigine.      Current Outpatient Medications   Medication Sig Dispense Refill     Docusate Sodium (COLACE PO) Take by mouth daily Take 1 am and 1 pm       enoxaparin (LOVENOX) 100 MG/ML SOLN Inject 175 mg Subcutaneous every 12 hours       lamoTRIgine (LAMICTAL) 100 MG tablet TAKE 4 1/2TABLETS (450mg) BY MOUTH AM and 3 TABS (300) AT NOON AND 4 1/2 TABLETS (500 MG) HS. (Patient taking differently: TAKE 4 1/2TABLETS BY MOUTH AM, 2 1/2 AT NOON, 4 AT HS.) 375 tablet 11     levETIRAcetam (KEPPRA) 500 MG tablet TAKE THREE TABLETS BY MOUTH THREE TIMES DAILY 270 tablet 11     LORazepam (ATIVAN PO) Take 0.5 mg by mouth        Medication Notes:        AED Medication Compliance:  compliant most of the time    Review of Systems:  Lethargy / Tiredness: once in a while toward the end of the workday.   Depression:  No  Slowed Cognitive Function:  No  Poor Balance: Improved upon decreasing lamotrigine.  Have you experienced a traumatic fall since your last visit: NO    Exam:    Alert and awake, NAD, no aphasia or dysarthria, EOMI, face symmetric, raises his arms against gravity, normal finger to nose.     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  He had a slight unsteadiness, which improved up and decreasing lamotrigine by 50 mg at noon and evening dose.  Had an aura in the setting of a URI.  Denies medication side effects.  No other issues were discussed in this visit.    -Continue lamotrigine 450-250-400 and levetiracetam 1500 mg tid.     - Follow up in 6 months.      As described above, I met with the patient for 11 minutes via Hundo and during this time counseling was within 50% of the visit time.  Flaquita Tam MD

## 2021-01-04 NOTE — LETTER
2021       RE: Kip Dailey  : 1970   MRN: [id number]      Dear Colleague,    Thank you for referring your patient, Kip Dailey, to the [facility] EPILEPSY CARE at [facility]. Please see a copy of my visit note below.    Kip Dailey is a 50 year old male who is being evaluated via a billable video visit.      How would you like to obtain your AVS?   If the video visit is dropped, the invitation should be resent by:   Will anyone else be joining your video visit? No      Video Start Time: 11:36    Video-Visit Details    Type of service:  Video Visit    Video End Time:11:36  Originating Location (pt. Location): Home    Distant Location (provider location):  [facility] EPILEPSY CARE     Platform used for Video Visit: Discretix    [facility] Epilepsy Care Progress Note      Patient:  Kip Dailey  :  1970   Age:  50 year old   Today's virtual Visit:  2021    Epilepsy Data:    Patient History  Primary Epileptologist/Provider: (P) Flaquita Tam M.D.  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 Kip Dailey is participating in this virtual visit for follow-up on his seizures.  He was last seen in 2020.  He did not have any seizures until last Thursday in which he  "had an aura.  He was sick for a couple days.  He had nasal drainage and dry cough for couple days.  He had an episode with feeling \"something was going to happen\".  It lasted only 20 to 30 seconds.  Did not progress further.    He was checked and was told it's a common cold.   His Covid test pending.     He did cut back his LMT to 450-250-400. His is taking Keppra 1500 mg tid.  He had slight unsteadiness which improved upon decreasing lamotrigine.      Current Outpatient Medications   Medication Sig Dispense Refill     Docusate Sodium (COLACE PO) Take by mouth daily Take 1 am and 1 pm       enoxaparin (LOVENOX) 100 MG/ML SOLN Inject 175 mg Subcutaneous every 12 hours       lamoTRIgine (LAMICTAL) 100 MG tablet TAKE 4 1/2TABLETS (450mg) BY MOUTH AM and 3 TABS (300) AT NOON AND 4 1/2 TABLETS (500 MG) HS. (Patient taking differently: TAKE 4 1/2TABLETS BY MOUTH AM, 2 1/2 AT NOON, 4 AT HS.) 375 tablet 11     levETIRAcetam (KEPPRA) 500 MG tablet TAKE THREE TABLETS BY MOUTH THREE TIMES DAILY 270 tablet 11     LORazepam (ATIVAN PO) Take 0.5 mg by mouth        Medication Notes:        AED Medication Compliance:  compliant most of the time    Review of Systems:  Lethargy / Tiredness: once in a while toward the end of the workday.   Depression:  No  Slowed Cognitive Function:  No  Poor Balance: Improved upon decreasing lamotrigine.  Have you experienced a traumatic fall since your last visit: NO    Exam:    Alert and awake, NAD, no aphasia or dysarthria, EOMI, face symmetric, raises his arms against gravity, normal finger to nose.     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  He had a slight unsteadiness, which improved up and decreasing lamotrigine by 50 mg at noon and evening dose.  Had an aura in the setting of a URI.  Denies medication side effects.  No other issues were discussed in this visit.    -Continue lamotrigine 450-250-400 and " levetiracetam 1500 mg tid.     - Follow up in 6 months.      As described above, I met with the patient for 11 minutes via AmWell and during this time counseling was within 50% of the visit time.  Flaquita Tam MD        Again, thank you for allowing me to participate in the care of your patient.      Sincerely,    Flaquita Tam MD

## 2021-04-09 ENCOUNTER — TELEPHONE (OUTPATIENT)
Dept: NEUROLOGY | Facility: CLINIC | Age: 51
End: 2021-04-09

## 2021-04-09 DIAGNOSIS — G40.109 LOCALIZATION-RELATED EPILEPSY (H): ICD-10-CM

## 2021-04-09 DIAGNOSIS — G40.109 PARTIAL SEIZURE DISORDER (H): ICD-10-CM

## 2021-04-09 NOTE — TELEPHONE ENCOUNTER
What is the concern that needs to be addressed by a nurse? Patient called and said he had reduced his Lamotrigine but noticed he was getting light headed and  Having auras two weeks after reducing. He said he also thought he might be having seizures at night when he's asleep so he went back up on the medication. Should he try a slower taper schedule on the med and if that's okay could new revised script for it be sent to his pharmacy?    May a detailed message be left on Tesaris? yes    Date of last office visit: 1/4/21    Message routed to: [facility] RN pool

## 2021-04-09 NOTE — TELEPHONE ENCOUNTER
Patient states he had a return of auras a few weeks ago so he increased his lamotirigne to his previous dosing of 450-300-450. He would like guidance on how he could reduce the doses slower than he did previously. If Dr. Mohan is ok with him reducing the lamotrigine again, he will need an updated prescription to [facility].

## 2021-04-12 RX ORDER — LAMOTRIGINE 100 MG/1
TABLET ORAL
Qty: 345 TABLET | Refills: 5 | Status: SHIPPED | OUTPATIENT
Start: 2021-04-12 | End: 2021-07-08

## 2021-06-22 ENCOUNTER — TELEPHONE (OUTPATIENT)
Dept: NEUROLOGY | Facility: CLINIC | Age: 51
End: 2021-06-22

## 2021-06-22 DIAGNOSIS — G40.209 LOCALIZATION-RELATED PARTIAL EPILEPSY WITH COMPLEX PARTIAL SEIZURES (H): ICD-10-CM

## 2021-06-22 DIAGNOSIS — G40.109 LOCALIZATION-RELATED EPILEPSY (H): Primary | ICD-10-CM

## 2021-06-22 NOTE — TELEPHONE ENCOUNTER
What is the concern that needs to be addressed by a nurse? Patient called wanting to discuss some adjustment to his medications. He said he has been having a few breakthrough seizures lately.    May a detailed message be left on voicemail? Yes    Date of last office visit: 4/9/21    Message routed to: [facility] RN Pool

## 2021-06-22 NOTE — TELEPHONE ENCOUNTER
"Call placed to patient    Patient notes that for about the past two months, he has been dealing with back and leg pain.  He has not had a good night sleep for about those two months  Over that time has has noticed an occasional \"aura\" breakthrough during the daytime, then about a week ago he would wake in the middle of the night wondering if he had had a seizure.    For the past two days in the afternoon he has had a coulple of events  From the point he or his wife notices an issue, to the point he is back to baseline is 15-25 seconds. Longest would be ~30 seconds.    He does not lose awareness, he can recall everything his wife is asking him. He knows where he is, but has trouble answering anything other than \"yes\" or \"no\".    He has noticed these will often happen between noon and 5pm, so he has adjusted his noon lamotrigine to previous dose  Yesterday he had 4-5 of the events, today he has had two.  They are his typical seizures, but happening more often than usual; none of the recent events have been a seizure with loss of awareness    For the past few days he has taken:-  AM  1500mg LEV  450mg     Noon for past 2days   400mg TRAYLOR    1700hrs  LEV 1500    HS   LEV 1500  TRAYLOR 450         Patient was prescribed PRN meds for his back and leg pain a few months ago, but he has only had a few tablets in total (none for the past two weeks, was originally provided with 10 tablets, and he still has a number of them left) otherwise he is receiving regular physical therapy as treatment for the pain    No other medical changes.    Patient is currently vacationing in [place], but returns Monday 28th    We discussed that he should have blood levels drawn, and then it is also time for the recommended 6 month follow up with Kimberlee Baig, so we arranged that for the week after the blood draw.  Patient was in agreement with this plan.  He was advised that he should seek medical advice locally if there are concerns about worsening " of the seizure, he agreed to do this.    Labs will need to be sent to :-  [facility]  Fax: [phone]

## 2021-07-02 ENCOUNTER — TRANSFERRED RECORDS (OUTPATIENT)
Dept: HEALTH INFORMATION MANAGEMENT | Facility: CLINIC | Age: 51
End: 2021-07-02

## 2021-07-04 LAB — KEPPRA (LEVETIRACETAM) LEVEL: 35.4 MCG/ML (ref 10–40)

## 2021-07-05 LAB — LAMOTRIGINE SERPL-MCNC: 20.4 MCG/ML (ref 2.5–15)

## 2021-07-07 DIAGNOSIS — G40.109 LOCALIZATION-RELATED EPILEPSY (H): ICD-10-CM

## 2021-07-08 ENCOUNTER — VIRTUAL VISIT (OUTPATIENT)
Dept: NEUROLOGY | Facility: CLINIC | Age: 51
End: 2021-07-08
Payer: COMMERCIAL

## 2021-07-08 DIAGNOSIS — G40.109 PARTIAL SEIZURE DISORDER (H): ICD-10-CM

## 2021-07-08 DIAGNOSIS — G40.209 LOCALIZATION-RELATED PARTIAL EPILEPSY WITH COMPLEX PARTIAL SEIZURES (H): ICD-10-CM

## 2021-07-08 DIAGNOSIS — G40.109 LOCALIZATION-RELATED EPILEPSY (H): ICD-10-CM

## 2021-07-08 RX ORDER — ACETAMINOPHEN 325 MG/1
325-650 TABLET ORAL EVERY 6 HOURS PRN
COMMUNITY

## 2021-07-08 RX ORDER — LAMOTRIGINE 100 MG/1
TABLET ORAL
Qty: 390 TABLET | Refills: 5 | Status: SHIPPED | OUTPATIENT
Start: 2021-07-08 | End: 2022-01-20

## 2021-07-08 RX ORDER — OMEGA-3 FATTY ACIDS/FISH OIL 300-1000MG
200 CAPSULE ORAL EVERY 4 HOURS PRN
COMMUNITY

## 2021-07-08 RX ORDER — LEVETIRACETAM 500 MG/1
TABLET ORAL
Qty: 270 TABLET | Refills: 5 | Status: SHIPPED | OUTPATIENT
Start: 2021-07-08 | End: 2022-01-07

## 2021-07-08 NOTE — PROGRESS NOTES
"Kip is a 51 year old who is being evaluated via a billable video visit.      How would you like to obtain your AVS? MyChart  If the video visit is dropped, the invitation should be resent by: Send to e-mail at: [email or web address]  Will anyone else be joining your video visit? No    Video visit was switched to a phone visit due to technical issues.     [facility]ilepsy [facility] Progress Note      Patient:  Kip Dailey  :  1970   Age:  51 year old   Today's Phone Visit:  2021    Epilepsy Data:    Patient History  Primary Epileptologist/Provider: Flaquita Tam M.D.  Patient Status: Controlled  Epilepsy Syndrome: Parietal lobe epilepsy  Epilepsy Syndrome Status: Final  Etiology  : Malformation of Cortical Development     Tests/Surgery History  Last EE12  Last MRI: 08  Last Neuropsych Testin08    Seizure Record  Current Visit Date: 21  Previous Visit Date: 21  Months since last visit:   Seizure Type 1: Simple partial seizures with sensory symptoms  Seizure Type 2: Simple partial seizures with motor signs  Seizure Type 3: Complex partial seizures unspecified  Seizure Type 4: Partial seizures with secondary generalization  -  with complex partial seizures evolving to generalized seizures    History of Present Illness:     Mr. Kip Dailey is participating in this virtual visit for a follow up on his seizures.  He had a few episodes as described as a feeling of \"something is not right\". These sometimes progressed, and his left shoulder dropped and his head turned to left, still is able to answer yes/no questions.  He had no NAE.  It takes 15-25 sec, max 30 sec to go back to baseline.    He had low back pain for couple weeks which was waking him up at night at least couple times before these episodes started.   He is taking lamotrigine 450-400-400 and levetiracetam 1500 mg tid.   He increased his lamotrigine from 300 to 350 about couple weeks ago and he " still had them.  They usually happened between noon dose and afternoon dose of his meds, so he increased lamotrigine from 350 to 400 mg and seizures improved. His lamotrigine level is supratherapeutic, he has occasional brief dizziness/unsteadiness, no falls.        Ref. Range 7/2/2021 13:41   Keppra (Levetiracetam) Level Latest Ref Range: 10.0 - 40.0 mcg/mL 35.4   Lamotrigine Level Latest Ref Range: 2.5 - 15.0 mcg/mL 20.4 (A)       Current Outpatient Medications   Medication Sig Dispense Refill     acetaminophen (TYLENOL) 325 MG tablet Take 325-650 mg by mouth every 6 hours as needed for mild pain       ibuprofen (ADVIL/MOTRIN) 200 MG capsule Take 200 mg by mouth every 4 hours as needed for fever       lamoTRIgine (LAMICTAL) 100 MG tablet TAKE 4 1/2 TABLETS BY MOUTH AM,  2 1/2 AT NOON, 4 1/2 AT HS. (Patient taking differently: TAKE 4 1/2 TABLETS BY MOUTH AM,  4 AT NOON, 4 1/2 AT HS.) 345 tablet 5     levETIRAcetam (KEPPRA) 500 MG tablet TAKE THREE TABLETS BY MOUTH THREE TIMES DAILY 270 tablet 11     Docusate Sodium (COLACE PO) Take by mouth daily Take 1 am and 1 pm       enoxaparin (LOVENOX) 100 MG/ML SOLN Inject 175 mg Subcutaneous every 12 hours       LORazepam (ATIVAN PO) Take 0.5 mg by mouth          Other Issues:    Is patient safe to drive:  Yes    Assessment and Plan:  Focal epilepsy: Kip had a few auras and focal unimpaired-aware seizures in the setting of having trouble sleeping due to low back pain.  These usually occurred between the noon and afternoon doses of his antiseizure medications, therefore he increase his lamotrigine from 300 mg to 350 mg, but seizures did not improve, so he increased it to 400 mg and seizures improved.  His lamotrigine level is supratherapeutic and he has occasional dizziness, but he does not think it's bothersome.  I advised him to inform me of increased dizziness, unsteadiness or falls.  If he had breakthrough seizures, may increase levetiracetam.       - Continue  lamotrigine 450-400-450 and levetiracetam 1500 mg tid.      - Follow up in 6 months.         As described above, I talked with the patient for 25 minutes and during this time counseling was greater than 50% of the visit time.This note was created in part by the use of Dragon voice recognition system. Inadvertent grammatical errors and typographical errors may still exist.  Flaquita Tam MD

## 2021-07-08 NOTE — LETTER
"2021       RE: Kip Dailey  : 1970   MRN: [id number]      Dear Colleague,    Thank you for referring your patient, Kip Dailey, to the [facility] EPILEPSY CARE at [facility]. Please see a copy of my visit note below.    Kip is a 51 year old who is being evaluated via a billable video visit.      How would you like to obtain your AVS? MyChart  If the video visit is dropped, the invitation should be resent by: Send to e-mail at: [email or web address]  Will anyone else be joining your video visit? No    Video visit was switched to a phone visit due to technical issues.     [facility] Epilepsy Care Progress Note      Patient:  Kip Dailey  :  1970   Age:  51 year old   Today's Phone Visit:  2021    Epilepsy Data:    Patient History  Primary Epileptologist/Provider: Flaquita Tam M.D.  Patient Status: Controlled  Epilepsy Syndrome: Parietal lobe epilepsy  Epilepsy Syndrome Status: Final  Etiology  : Malformation of Cortical Development     Tests/Surgery History  Last EE12  Last MRI: 08  Last Neuropsych Testin08    Seizure Record  Current Visit Date: 21  Previous Visit Date: 21  Months since last visit:   Seizure Type 1: Simple partial seizures with sensory symptoms  Seizure Type 2: Simple partial seizures with motor signs  Seizure Type 3: Complex partial seizures unspecified  Seizure Type 4: Partial seizures with secondary generalization  -  with complex partial seizures evolving to generalized seizures    History of Present Illness:     Mr. Kip Dailey is participating in this virtual visit for a follow up on his seizures.  He had a few episodes as described as a feeling of \"something is not right\". These sometimes progressed, and his left shoulder dropped and his head turned to left, still is able to answer yes/no questions.  He had no NAE.  It takes 15-25 sec, max 30 sec to go back to baseline.    He " had low back pain for couple weeks which was waking him up at night at least couple times before these episodes started.   He is taking lamotrigine 450-400-400 and levetiracetam 1500 mg tid.   He increased his lamotrigine from 300 to 350 about couple weeks ago and he still had them.  They usually happened between noon dose and afternoon dose of his meds, so he increased lamotrigine from 350 to 400 mg and seizures improved. His lamotrigine level is supratherapeutic, he has occasional brief dizziness/unsteadiness, no falls.        Ref. Range 7/2/2021 13:41   Keppra (Levetiracetam) Level Latest Ref Range: 10.0 - 40.0 mcg/mL 35.4   Lamotrigine Level Latest Ref Range: 2.5 - 15.0 mcg/mL 20.4 (A)       Current Outpatient Medications   Medication Sig Dispense Refill     acetaminophen (TYLENOL) 325 MG tablet Take 325-650 mg by mouth every 6 hours as needed for mild pain       ibuprofen (ADVIL/MOTRIN) 200 MG capsule Take 200 mg by mouth every 4 hours as needed for fever       lamoTRIgine (LAMICTAL) 100 MG tablet TAKE 4 1/2 TABLETS BY MOUTH AM,  2 1/2 AT NOON, 4 1/2 AT HS. (Patient taking differently: TAKE 4 1/2 TABLETS BY MOUTH AM,  4 AT NOON, 4 1/2 AT HS.) 345 tablet 5     levETIRAcetam (KEPPRA) 500 MG tablet TAKE THREE TABLETS BY MOUTH THREE TIMES DAILY 270 tablet 11     Docusate Sodium (COLACE PO) Take by mouth daily Take 1 am and 1 pm       enoxaparin (LOVENOX) 100 MG/ML SOLN Inject 175 mg Subcutaneous every 12 hours       LORazepam (ATIVAN PO) Take 0.5 mg by mouth          Other Issues:    Is patient safe to drive:  Yes    Assessment and Plan:  Focal epilepsy: Kip had a few auras and focal unimpaired-aware seizures in the setting of having trouble sleeping due to low back pain.  These usually occurred between the noon and afternoon doses of his antiseizure medications, therefore he increase his lamotrigine from 300 mg to 350 mg, but seizures did not improve, so he increased it to 400 mg and seizures improved.  His  lamotrigine level is supratherapeutic and he has occasional dizziness, but he does not think it's bothersome.  I advised him to inform me of increased dizziness, unsteadiness or falls.  If he had breakthrough seizures, may increase levetiracetam.       - Continue lamotrigine 450-400-450 and levetiracetam 1500 mg tid.      - Follow up in 6 months.         As described above, I talked with the patient for 25 minutes and during this time counseling was greater than 50% of the visit time.This note was created in part by the use of Dragon voice recognition system. Inadvertent grammatical errors and typographical errors may still exist.  Flaquita Tam MD                        Again, thank you for allowing me to participate in the care of your patient.      Sincerely,    Flaquita Tam MD

## 2021-10-03 ENCOUNTER — HEALTH MAINTENANCE LETTER (OUTPATIENT)
Age: 51
End: 2021-10-03

## 2022-01-03 ENCOUNTER — TELEPHONE (OUTPATIENT)
Dept: NEUROLOGY | Facility: CLINIC | Age: 52
End: 2022-01-03

## 2022-01-03 NOTE — TELEPHONE ENCOUNTER
Central Prior Authorization Team   Phone: [phone]    PA Initiation    Medication: LAMICTAL 100 MG tablet  Insurance Company: [employer] - Phone [phone] Fax [phone]  Pharmacy Filling the Rx: [facility]  [place] - [address]  Filling Pharmacy Phone: [phone]  Filling Pharmacy Fax: [phone]  Start Date: 1/3/2022

## 2022-01-03 NOTE — TELEPHONE ENCOUNTER
PA now needed for Lamictal with New Insurance, unsure if needed for Keppra    Ref#[id number]-for lamictal  Prior Authorization Retail Medication Request    Medication/Dose: Lamictal 100MG  ICD code (if different than what is on RX):    Previously Tried and Failed:    Rationale:      Insurance Name:  [employer]  Insurance ID:        Pharmacy Information (if different than what is on RX)  Name:    Phone:      Patient will need refills in few days

## 2022-01-03 NOTE — TELEPHONE ENCOUNTER
Prior Authorization Approval        Authorization Effective Date: 1/1/2022  Authorization Expiration Date: 1/3/2023  Medication: LAMICTAL 100 MG tablet-PA APPROVED   Approved Dose/Quantity:   Reference #:     Insurance Company: [employer] - Phone [phone] Fax [phone]  Expected CoPay:       CoPay Card Available:      Foundation Assistance Needed:    Which Pharmacy is filling the prescription (Not needed for infusion/clinic administered): [facility] [address] [place] [address]  Pharmacy Notified: Yes- Pharmacy stated that they have a paid claim on medication on 12/31/2021 through [employer] quantity 390 for 30 day supply and patient has picked up medication. Pharmacy stated that they do not have New Insurance information for 2022 and will contact patient with New Insurance information and make note that PA is Approved with New Insurance [employer].  Patient Notified: Yes

## 2022-01-04 ENCOUNTER — TELEPHONE (OUTPATIENT)
Dept: NEUROLOGY | Facility: CLINIC | Age: 52
End: 2022-01-04
Payer: COMMERCIAL

## 2022-01-05 ENCOUNTER — TELEPHONE (OUTPATIENT)
Dept: NEUROLOGY | Facility: CLINIC | Age: 52
End: 2022-01-05

## 2022-01-05 DIAGNOSIS — G40.209 LOCALIZATION-RELATED PARTIAL EPILEPSY WITH COMPLEX PARTIAL SEIZURES (H): ICD-10-CM

## 2022-01-05 DIAGNOSIS — G40.109 PARTIAL SEIZURE DISORDER (H): ICD-10-CM

## 2022-01-05 NOTE — TELEPHONE ENCOUNTER
Prior Authorization Not Needed per Insurance    Medication: levETIRAcetam (KEPPRA) 500 MG tablet-PA NOT NEEDED   Insurance Company: [employer] - Phone [phone] Fax [phone]  Expected CoPay:      Pharmacy Filling the Rx: [facility] [address] [place] [address]  Pharmacy Notified: Yes  Patient Notified: No    Called insurance and Rep Haley H stated that PA is Not Needed and medication is covered. Called pharmacy and pharmacy stated that they do not have a current script on file with refills remaining.     Pharmacy is requesting a New Script with refills be sent to filling pharmacy at [facility] [address] [place] [address]. **Instructed pharmacy to notify patient when script is ready to /ship.** Pharmacy will notify patient when medication is ready for .

## 2022-01-05 NOTE — TELEPHONE ENCOUNTER
Prior Authorization Retail Medication Request    Medication/Dose: levETIRAcetam (KEPPRA) 500 MG tablet  ICD code (if different than what is on RX):    Previously Tried and Failed:    Rationale:      Insurance Name:    Insurance ID:        Pharmacy Information (if different than what is on RX)  Name:    Phone:     Patient called back to clinic and was informed that a PA did need to be run on the Keppra as well. Patient will need refills soon

## 2022-01-07 RX ORDER — LEVETIRACETAM 500 MG/1
TABLET ORAL
Qty: 270 TABLET | Refills: 0 | Status: SHIPPED | OUTPATIENT
Start: 2022-01-07 | End: 2022-01-20

## 2022-01-07 NOTE — TELEPHONE ENCOUNTER
LEVETIRACETAM 500MG TAB   Last Written Prescription Date:  7/8/2021  Last Fill Quantity: 270,   # refills: 5  Last Office Visit : 7/8/2021  Future Office visit:  1/20/2022  270 Tabs, sent to pharm 1/7/2022      Suzy Miller RN  Central Triage Red Flags/Med Refills

## 2022-01-20 ENCOUNTER — VIRTUAL VISIT (OUTPATIENT)
Dept: NEUROLOGY | Facility: CLINIC | Age: 52
End: 2022-01-20
Payer: COMMERCIAL

## 2022-01-20 DIAGNOSIS — G40.109 PARTIAL SEIZURE DISORDER (H): ICD-10-CM

## 2022-01-20 DIAGNOSIS — G40.109 LOCALIZATION-RELATED EPILEPSY (H): ICD-10-CM

## 2022-01-20 DIAGNOSIS — G40.209 LOCALIZATION-RELATED PARTIAL EPILEPSY WITH COMPLEX PARTIAL SEIZURES (H): ICD-10-CM

## 2022-01-20 RX ORDER — LAMOTRIGINE 100 MG/1
TABLET ORAL
Qty: 360 TABLET | Refills: 11 | Status: SHIPPED | OUTPATIENT
Start: 2022-01-20 | End: 2023-02-13

## 2022-01-20 RX ORDER — LEVETIRACETAM 500 MG/1
TABLET ORAL
Qty: 180 TABLET | Refills: 11 | Status: SHIPPED | OUTPATIENT
Start: 2022-01-20 | End: 2022-10-11

## 2022-01-20 NOTE — LETTER
2022       RE: Kip Dailey  : 1970   MRN: [id number]      Dear Colleague,    Thank you for referring your patient, Kip Dailey, to the [facility] EPILEPSY CARE at . Please see a copy of my visit note below.    Kip is a 51 year old who is being evaluated via a billable video visit.       How would you like to obtain your AVS? MyChart  If the video visit is dropped, the invitation should be resent by: Send to e-mail at: [email or web address]  Will anyone else be joining your video visit? No    Video Start Time: 9:47  Video-Visit Details    Type of service:  Video Visit    Video End Time:9:57  Originating Location (pt. Location): Home    Distant Location (provider location):  [facility] EPILEPSY CARE     Platform used for Video Visit: [facility] Epilepsy Care Progress Note      Patient:  Kip Dailey  :  1970   Age:  51 year old   Today's Office Visit:  2022    Epilepsy Data:    Patient History  Primary Epileptologist/Provider: (P) Flaquita Tam M.D.  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     Tests/Surgery History  Last EEG: (P) 12  Last MRI: (P) 08  Last Neuropsych Testing: (P) 08    Seizure Record  Current Visit Date: (P) 22  Previous Visit Date: (P) 21  Months since last visit: (P) 6.44  Seizure Type 1: (P) Simple partial seizures with sensory symptoms  Seizure Type 2: (P) Simple partial seizures with motor signs  Seizure Type 3: (P) Complex partial seizures unspecified  Seizure Type 4: (P) Partial seizures with secondary generalization  -  with complex partial seizures evolving to generalized seizures    History of Present Illness:    Mr. Shin is participating in this virtual visit for a follow up on his epilepsy. He did not have any seizures since last  visit in 7/2021.   He is taking lamotrigine 450-400-450 and levetiracetam 1500 mg tid. He increased his lamotrigine before the previous visit for increased seizures and his seizures improved. His LMT level was supratherapeutic and he had dizziness, so he decreased his lamotrigine to 450-300-450.   His dizziness improved.     Other medical issues: No ER visits or hospitalizations.     Results for IRLANDA BARAJAS (MRN [id number]) as of 1/20/2022 09:42   Ref. Range 7/2/2021 13:41   Keppra (Levetiracetam) Level Latest Ref Range: 10.0 - 40.0 mcg/mL 35.4   Lamotrigine Level Latest Ref Range: 2.5 - 15.0 mcg/mL 20.4 (A)         Current Outpatient Medications   Medication Sig Dispense Refill     acetaminophen (TYLENOL) 325 MG tablet Take 325-650 mg by mouth every 6 hours as needed for mild pain       ibuprofen (ADVIL/MOTRIN) 200 MG capsule Take 200 mg by mouth every 4 hours as needed for fever       lamoTRIgine (LAMICTAL) 100 MG tablet TAKE 4 1/2 TABLETS BY MOUTH AM,  4 AT NOON, 4 1/2 AT HS. (Patient taking differently: TAKE 4 1/2 TABLETS BY MOUTH AM,  3 AT NOON, 4 1/2 AT HS.) 390 tablet 5     levETIRAcetam (KEPPRA) 500 MG tablet Take 3 tablet (1,500 mg) by mouth 3 times arshad. 270 tablet 0     Docusate Sodium (COLACE PO) Take by mouth daily Take 1 am and 1 pm (Patient not taking: Reported on 1/20/2022)       enoxaparin (LOVENOX) 100 MG/ML SOLN Inject 175 mg Subcutaneous every 12 hours (Patient not taking: Reported on 1/20/2022)       LORazepam (ATIVAN PO) Take 0.5 mg by mouth (Patient not taking: Reported on 1/20/2022)          Other Issues:    Is patient safe to drive:  Yes    Exam:    Wt Readings from Last 5 Encounters:   01/16/20 295 lb (133.8 kg)   07/11/19 288 lb 12.8 oz (131 kg)   01/10/19 299 lb (135.6 kg)   07/19/18 316 lb 9.6 oz (143.6 kg)   08/17/17 292 lb 6.4 oz (132.6 kg)     Alert, awake, NAD, no aphasia or dysarthria, EOMI, face symmetric, moves upper extremities against gravity, normal finger to nose.      Assessment and Plan:     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 His dizziness improved with lowering his lamotrigine. He is currently taking lamotrigine 450-300-450 and levetiracetam 1500 mg bid.     - Continue lamotrigine 450-300-450 and levetiracetam 1500 mg tid.     - Obtain lamotrigine level      - Follow up in 6 months.      As described above, I met with the patient for 10 minutes and during this time counseling was within 50% of the visit time.  Flaquita Tam MD                      Again, thank you for allowing me to participate in the care of your patient.      Sincerely,    Flaquita Tam MD

## 2022-01-20 NOTE — PROGRESS NOTES
Kip is a 51 year old who is being evaluated via a billable video visit.       How would you like to obtain your AVS? MyChart  If the video visit is dropped, the invitation should be resent by: Send to e-mail at: [email or web address]  Will anyone else be joining your video visit? No    Video Start Time: 9:47  Video-Visit Details    Type of service:  Video Visit    Video End Time:9:57  Originating Location (pt. Location): Home    Distant Location (provider location):  [facility] EPILEPSY C[facility]     Platform used for Video Visit: [facility] Epilepsy Care Progress Note      Patient:  Kip Dailey  :  1970   Age:  51 year old   Today's Office Visit:  2022    Epilepsy Data:    Patient History  Primary Epileptologist/Provider: Flaquita Tam M.D. 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     Tests/Surgery History  Last EEG: (P) 12  Last MRI: (P) 08  Last Neuropsych Testing: (P) 08    Seizure Record  Current Visit Date: (P) 22  Previous Visit Date: (P) 21  Months since last visit: (P) 644  Seizure Type 1: (P) Simple partial seizures with sensory symptoms  Seizure Type 2: (P) Simple partial seizures with motor signs  Seizure Type 3: (P) Complex partial seizures unspecified  Seizure Type 4: (P) Partial seizures with secondary generalization  -  with complex partial seizures evolving to generalized seizures    History of Present Illness:    Mr. Shin is participating in this virtual visit for a follow up on his epilepsy. He did not have any seizures since last visit in 2021.   He is taking lamotrigine 450-400-450 and levetiracetam 1500 mg tid. He increased his lamotrigine before the previous visit for increased seizures and his seizures improved. His LMT level was supratherapeutic and he had dizziness, so he decreased his  lamotrigine to 450-300-450.   His dizziness improved.     Other medical issues: No ER visits or hospitalizations.     Results for IRLANDA BARAJAS (MRN [id number]) as of 1/20/2022 09:42   Ref. Range 7/2/2021 13:41   Keppra (Levetiracetam) Level Latest Ref Range: 10.0 - 40.0 mcg/mL 35.4   Lamotrigine Level Latest Ref Range: 2.5 - 15.0 mcg/mL 20.4 (A)         Current Outpatient Medications   Medication Sig Dispense Refill     acetaminophen (TYLENOL) 325 MG tablet Take 325-650 mg by mouth every 6 hours as needed for mild pain       ibuprofen (ADVIL/MOTRIN) 200 MG capsule Take 200 mg by mouth every 4 hours as needed for fever       lamoTRIgine (LAMICTAL) 100 MG tablet TAKE 4 1/2 TABLETS BY MOUTH AM,  4 AT NOON, 4 1/2 AT HS. (Patient taking differently: TAKE 4 1/2 TABLETS BY MOUTH AM,  3 AT NOON, 4 1/2 AT HS.) 390 tablet 5     levETIRAcetam (KEPPRA) 500 MG tablet Take 3 tablet (1,500 mg) by mouth 3 times arshad. 270 tablet 0     Docusate Sodium (COLACE PO) Take by mouth daily Take 1 am and 1 pm (Patient not taking: Reported on 1/20/2022)       enoxaparin (LOVENOX) 100 MG/ML SOLN Inject 175 mg Subcutaneous every 12 hours (Patient not taking: Reported on 1/20/2022)       LORazepam (ATIVAN PO) Take 0.5 mg by mouth (Patient not taking: Reported on 1/20/2022)          Other Issues:    Is patient safe to drive:  Yes    Exam:    Wt Readings from Last 5 Encounters:   01/16/20 295 lb (133.8 kg)   07/11/19 288 lb 12.8 oz (131 kg)   01/10/19 299 lb (135.6 kg)   07/19/18 316 lb 9.6 oz (143.6 kg)   08/17/17 292 lb 6.4 oz (132.6 kg)     Alert, awake, NAD, no aphasia or dysarthria, EOMI, face symmetric, moves upper extremities against gravity, normal finger to nose.     Assessment and Plan:     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 His dizziness improved with lowering his lamotrigine. He is currently taking lamotrigine 450-300-450 and levetiracetam 1500 mg bid.     - Continue lamotrigine 450-300-450 and levetiracetam  1500 mg tid.     - Obtain lamotrigine level      - Follow up in 6 months.      As described above, I met with the patient for 10 minutes and during this time counseling was within 50% of the visit time.  Flaquita Tam MD

## 2022-01-23 ENCOUNTER — HEALTH MAINTENANCE LETTER (OUTPATIENT)
Age: 52
End: 2022-01-23

## 2022-02-01 ENCOUNTER — TRANSFERRED RECORDS (OUTPATIENT)
Dept: HEALTH INFORMATION MANAGEMENT | Facility: CLINIC | Age: 52
End: 2022-02-01

## 2022-07-08 NOTE — TELEPHONE ENCOUNTER
Received DMV form to be completed.  Form saved to the [employer] [address].  Encounter routed.   No adenopathy or splenomegaly. No cervical or inguinal lymphadenopathy.

## 2022-08-02 ENCOUNTER — VIRTUAL VISIT (OUTPATIENT)
Dept: NEUROLOGY | Facility: CLINIC | Age: 52
End: 2022-08-02
Payer: COMMERCIAL

## 2022-08-02 VITALS — HEIGHT: 76 IN | WEIGHT: 300.2 LBS | BODY MASS INDEX: 36.56 KG/M2

## 2022-08-02 DIAGNOSIS — G40.109 FOCAL EPILEPSY (H): Primary | ICD-10-CM

## 2022-08-02 ASSESSMENT — PATIENT HEALTH QUESTIONNAIRE - PHQ9
SUM OF ALL RESPONSES TO PHQ QUESTIONS 1-9: 0
SUM OF ALL RESPONSES TO PHQ QUESTIONS 1-9: 0

## 2022-08-02 ASSESSMENT — PAIN SCALES - GENERAL: PAINLEVEL: NO PAIN (0)

## 2022-08-02 NOTE — PROGRESS NOTES
Kip is a 52 year old who is being evaluated via a billable video visit.      How would you like to obtain your AVS? Mail a copy  If the video visit is dropped, the invitation should be resent by: Send to e-mail at: [email or web address]  Will anyone else be joining your video visit? No    Video-Visit Details    Video Start Time: 12:37    Type of service:  Video Visit    Video End Time: 12:46    Originating Location (pt. Location): Home    Distant Location (provider location):  [facility]     Platform used for Video Visit: [facility]     Medication and allergies have been reviewed.     ALENA Quach      [facility] Progress Note    Patient:  Kip Dailey  :  1970   Age:  52 year old   Today's Virtual Visit:  2022    Epilepsy Data:    Patient History  Primary Epileptologist/Provider: Flaquita Tam M.D.  Patient Status: Controlled  Epilepsy Syndrome: Parietal lobe epilepsy  Epilepsy Syndrome Status: Final  Etiology  : Malformation of Cortical Development     Tests/Surgery History  Last EE12  Last MRI: 08  Last Neuropsych Testin08    Seizure Record  Current Visit Date: 22  Previous Visit Date: 22  Months since last visit: 6.37  Seizure Type 1: Simple partial seizures with sensory symptoms  Seizure Type 2: Simple partial seizures with motor signs  Seizure Type 3: Complex partial seizures unspecified  Seizure Type 4: Partial seizures with secondary generalization  -  with complex partial seizures evolving to generalized seizures    History of Present Illness:     Kip is participating in this virtual visit for a follow up on her seizures.  He was last seen 2022.      He is taking levetiracetam 1500 mg tid and lamotrigine 450-300-450    Hodgkin's lymphoma:  He has been in remission for about 2 years. His provider at [facility] has told him that it probably doesn't come back again.     He was tested positive for Covid-19 in the last part of  May. He had a nonproductive cough and runny nose, and feeling tired.      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     Current Outpatient Medications   Medication Sig Dispense Refill     acetaminophen (TYLENOL) 325 MG tablet Take 325-650 mg by mouth every 6 hours as needed for mild pain       ibuprofen (ADVIL/MOTRIN) 200 MG capsule Take 200 mg by mouth every 4 hours as needed for fever       lamoTRIgine (LAMICTAL) 100 MG tablet TAKE 4 1/2 TABLETS BY MOUTH AM,  3 AT NOON, 4 1/2 AT HS. 360 tablet 11     levETIRAcetam (KEPPRA) 500 MG tablet Take 3 tablet (1,500 mg) by mouth 3 times arshad. 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 His dizziness improved with lowering his lamotrigine. He is currently taking lamotrigine 450-300-450 and levetiracetam 1500 mg bid. denies side effects.      - Continue lamotrigine 450-300-450 and levetiracetam 1500 mg tid.      - Obtain lamotrigine level      - Follow up in 6 months.      As described above, I met with the patient for 9 minutes and during this time counseling was within 50% of the visit time.  Flaquita Tam MD                    Answers for HPI/ROS submitted by the patient on 8/2/2022  PHQ9 TOTAL SCORE: 0

## 2022-08-02 NOTE — LETTER
2022       RE: Kip Dailey  : 1970   MRN: [id number]      Dear Colleague,    Thank you for referring your patient, Kip Dailey, to the [facility] EPILEPSY CARE at [facility]. Please see a copy of my visit note below.    Kip is a 52 year old who is being evaluated via a billable video visit.      How would you like to obtain your AVS? Mail a copy  If the video visit is dropped, the invitation should be resent by: Send to e-mail at: [email or web address]  Will anyone else be joining your video visit? No    Video-Visit Details    Video Start Time: 12:37    Type of service:  Video Visit    Video End Time: 12:46    Originating Location (pt. Location): Home    Distant Location (provider location):  [facility] EPILEPSY [facility]     Platform used for Video Visit: [facility]     Medication and allergies have been reviewed.     ALENA Quach      [facility] Epilepsy Care Progress Note    Patient:  Kip Dailey  :  1970   Age:  52 year old   Today's Virtual Visit:  2022    Epilepsy Data:    Patient History  Primary Epileptologist/Provider: Flaquita Tam M.D.  Patient Status: Controlled  Epilepsy Syndrome: Parietal lobe epilepsy  Epilepsy Syndrome Status: Final  Etiology  : Malformation of Cortical Development     Tests/Surgery History  Last EE12  Last MRI: 08  Last Neuropsych Testin08    Seizure Record  Current Visit Date: 22  Previous Visit Date: 22  Months since last visit: 6.37  Seizure Type 1: Simple partial seizures with sensory symptoms  Seizure Type 2: Simple partial seizures with motor signs  Seizure Type 3: Complex partial seizures unspecified  Seizure Type 4: Partial seizures with secondary generalization  -  with complex partial seizures evolving to generalized seizures    History of Present Illness:     Kip is participating in this virtual visit for a follow up on her seizures.  He was last seen 2022.       He is taking levetiracetam 1500 mg tid and lamotrigine 450-300-450    Hodgkin's lymphoma:  He has been in remission for about 2 years. His provider at [facility] has told him that it probably doesn't come back again.     He was tested positive for Covid-19 in the last part of May. He had a nonproductive cough and runny nose, and feeling tired.      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     Current Outpatient Medications   Medication Sig Dispense Refill     acetaminophen (TYLENOL) 325 MG tablet Take 325-650 mg by mouth every 6 hours as needed for mild pain       ibuprofen (ADVIL/MOTRIN) 200 MG capsule Take 200 mg by mouth every 4 hours as needed for fever       lamoTRIgine (LAMICTAL) 100 MG tablet TAKE 4 1/2 TABLETS BY MOUTH AM,  3 AT NOON, 4 1/2 AT HS. 360 tablet 11     levETIRAcetam (KEPPRA) 500 MG tablet Take 3 tablet (1,500 mg) by mouth 3 times arshad. 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 His dizziness improved with lowering his lamotrigine. He is currently taking lamotrigine 450-300-450 and levetiracetam 1500 mg bid. denies side effects.      - Continue lamotrigine 450-300-450 and levetiracetam 1500 mg tid.      - Obtain lamotrigine level      - Follow up in 6 months.      As described above, I met with the patient for 9 minutes and during this time counseling was within 50% of the visit time.  Flaquita Tam MD                    Answers for HPI/ROS submitted by the patient on 8/2/2022  PHQ9 TOTAL  SCORE: 0          Again, thank you for allowing me to participate in the care of your patient.      Sincerely,    Flaquita Tam MD

## 2022-09-16 ENCOUNTER — TRANSFERRED RECORDS (OUTPATIENT)
Dept: HEALTH INFORMATION MANAGEMENT | Facility: CLINIC | Age: 52
End: 2022-09-16

## 2022-10-08 DIAGNOSIS — G40.109 PARTIAL SEIZURE DISORDER (H): ICD-10-CM

## 2022-10-08 DIAGNOSIS — G40.209 LOCALIZATION-RELATED PARTIAL EPILEPSY WITH COMPLEX PARTIAL SEIZURES (H): ICD-10-CM

## 2022-10-11 RX ORDER — LEVETIRACETAM 500 MG/1
TABLET ORAL
Qty: 270 TABLET | Refills: 11 | Status: SHIPPED | OUTPATIENT
Start: 2022-10-11 | End: 2023-10-10

## 2022-10-11 NOTE — TELEPHONE ENCOUNTER
Medication Refill request received for   Pending Prescriptions:                       Disp   Refills    levETIRAcetam (KEPPRA) 500 MG tablet [Pha*180 ta*0            Sig: TAKE 3 TABLETS BY MOUTH THREE TIMES DAILY      Last Written Prescription Date:  1/20/2022  Last Fill Quantity: 180,   # refills: 11 Length of last prescription in time: one year  Last Office Visit : 8/2/22  Future Office visit:  Due Feb 2023    [facility] Medication Refill Protocol requirements:   Latest Reference Range & Units 09/16/22 09:51   KEPPRA (LEVETIRACETAM) LEVEL (EXTERNAL) 10.0 - 40.0 mcg/mL 43.9 (H)   (H): Data is abnormally high    Refill request was forwarded to the provider because prescription is due for a yearly MD signature

## 2023-01-31 ENCOUNTER — TELEPHONE (OUTPATIENT)
Dept: NEUROLOGY | Facility: CLINIC | Age: 53
End: 2023-01-31

## 2023-01-31 NOTE — TELEPHONE ENCOUNTER
Received DMV Form Form to be completed. Form saved to [employer], encounter routed.  Claudia Vázquez CMA

## 2023-02-02 NOTE — TELEPHONE ENCOUNTER
DMV form signed, faxed and mailed  to [employer] on 02/02/2023, sent to scanning, and copy mailed to patient.

## 2023-02-09 DIAGNOSIS — G40.109 PARTIAL SEIZURE DISORDER (H): ICD-10-CM

## 2023-02-09 DIAGNOSIS — G40.109 LOCALIZATION-RELATED EPILEPSY (H): ICD-10-CM

## 2023-02-09 NOTE — TELEPHONE ENCOUNTER
Patient requesting refill. Patient past due 6 month f/u deadline from provider. I scheduled patient for an appt on 03/28/2023      [facility] [place] [id number] [address] [address]  Phone: [phone] Fax: [phone]

## 2023-02-13 RX ORDER — LAMOTRIGINE 100 MG/1
TABLET ORAL
Qty: 360 TABLET | Refills: 1 | Status: SHIPPED | OUTPATIENT
Start: 2023-02-13 | End: 2023-04-19

## 2023-02-13 NOTE — TELEPHONE ENCOUNTER
LCV:8/2/2022  [facility] Epilepsy Care  NCV:3-28-23    Filling per [facility] medication refill protocols - seizure medications.  Not all labs required.

## 2023-03-28 ENCOUNTER — VIRTUAL VISIT (OUTPATIENT)
Dept: NEUROLOGY | Facility: CLINIC | Age: 53
End: 2023-03-28
Payer: COMMERCIAL

## 2023-03-28 VITALS — WEIGHT: 291 LBS | BODY MASS INDEX: 35.44 KG/M2 | HEIGHT: 76 IN

## 2023-03-28 DIAGNOSIS — G40.109 FOCAL EPILEPSY (H): Primary | ICD-10-CM

## 2023-03-28 ASSESSMENT — PATIENT HEALTH QUESTIONNAIRE - PHQ9: SUM OF ALL RESPONSES TO PHQ QUESTIONS 1-9: 3

## 2023-03-28 ASSESSMENT — PAIN SCALES - GENERAL: PAINLEVEL: NO PAIN (0)

## 2023-03-28 NOTE — NURSING NOTE
Is the patient currently in the state of [place]? YES    Visit mode:VIDEO    If the visit is dropped, the patient can be reconnected by: VIDEO VISIT: Text to cell phone:     Will anyone else be joining the visit? NO      How would you like to obtain your AVS? MyChart    Are changes needed to the allergy or medication list? NO    Reason for visit:   Follow up    Sherley Patterson, VF

## 2023-03-28 NOTE — LETTER
3/28/2023       RE: Kip Dailey  : 1970   MRN: [id number]      Dear Colleague,    Thank you for referring your patient, Kip Dailey, to the [facility] EPILEPSY CARE at [facility]. Please see a copy of my visit note below.    Virtual Visit Details    Type of service:  Video Visit     Originating Location (pt. Location): Home    Distant Location (provider location):  On-site  Platform used for Video Visit: [facility] Epilepsy Care Progress Note      Patient:  Kip Dailey  :  1970   Age:  52 year old   Today's Virtual Visit:  3/28/2023    Epilepsy Data:    Patient History  Primary Epileptologist/Provider: Flaquita Tam M.D.  Patient Status: Controlled  Epilepsy Syndrome: Parietal lobe epilepsy  Epilepsy Syndrome Status: Final  Etiology  : Malformation of Cortical Development      Tests/Surgery History  Last EE12  Last MRI: 08  Last Neuropsych Testin08     Seizure Record  Current Visit Date: 23  Previous Visit Date: 22  Seizure Type 1: Simple partial seizures with sensory symptoms  Seizure Type 2: Simple partial seizures with motor signs  Seizure Type 3: Complex partial seizures unspecified  Seizure Type 4: Partial seizures with secondary generalization  -  with complex partial seizures evolving to generalized seizures      History of Present Illness:    He is taking levetiracetam 1500 mg tid and lamotrigine 450-300-400. He decreased his lamotrigine evening dose from 450 to 400 due to slight dizziness.     No recent illnesses, ER visit or hospitalization.      ASD levels 2022:    Levetiracetam: 43.9  Lamotrigine: 15.2    Current Outpatient Medications   Medication Sig Dispense Refill     acetaminophen (TYLENOL) 325 MG tablet Take 325-650 mg by mouth every 6 hours as needed for mild pain       ibuprofen (ADVIL/MOTRIN) 200 MG capsule Take 200 mg by mouth every 4 hours as needed for fever        lamoTRIgine (LAMICTAL) 100 MG tablet TAKE 4 1/2 TABLETS BY MOUTH AM,  3 AT NOON, 4 1/2 AT HS.Please keep 3-28-23 clinic appt for refills. 360 tablet 1     levETIRAcetam (KEPPRA) 500 MG tablet TAKE 3 TABLETS BY MOUTH THREE TIMES DAILY 270 tablet 11     Docusate Sodium (COLACE PO) Take by mouth daily Take 1 am and 1 pm (Patient not taking: Reported on 1/20/2022)       enoxaparin (LOVENOX) 100 MG/ML SOLN Inject 175 mg Subcutaneous every 12 hours (Patient not taking: Reported on 1/20/2022)       LORazepam (ATIVAN PO) Take 0.5 mg by mouth (Patient not taking: Reported on 1/20/2022)          Medication Notes:        AED Medication Compliance:  noncompliant some of the time    Other Issues:    Is patient safe to drive:  Yes    Assessment and Plan:    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  He decreased evening dose of his lamotrigine due to slight dizziness. His dizziness improved with lowering his lamotrigine. He is currently taking lamotrigine 450-300-400 and levetiracetam 1500 mg bid.  His ASD levels are therapeutic.  Denies side effects.      - Continue lamotrigine and levetiracetam as before.      - Obtain lamotrigine level      - Follow up in 6 months.      As described above, I met with the patient for 7 minutes and during this time counseling was greater than 50% of the visit time.  Flaquita Tam MD                              Again, thank you for allowing me to participate in the care of your patient.      Sincerely,    Flaquita Tam MD

## 2023-03-28 NOTE — PROGRESS NOTES
Virtual Visit Details    Type of service:  Video Visit     Originating Location (pt. Location): Home    Distant Location (provider location):  On-site  Platform used for Video Visit: [facility] Progress Note      Patient:  Kip Dailey  :  1970   Age:  52 year old   Today's Virtual Visit:  3/28/2023    Epilepsy Data:    Patient History  Primary Epileptologist/Provider: Flaquita Tam M.D.  Patient Status: Controlled  Epilepsy Syndrome: Parietal lobe epilepsy  Epilepsy Syndrome Status: Final  Etiology  : Malformation of Cortical Development      Tests/Surgery History  Last EE12  Last MRI: 08  Last Neuropsych Testin08     Seizure Record  Current Visit Date: 23  Previous Visit Date: 22  Seizure Type 1: Simple partial seizures with sensory symptoms  Seizure Type 2: Simple partial seizures with motor signs  Seizure Type 3: Complex partial seizures unspecified  Seizure Type 4: Partial seizures with secondary generalization  -  with complex partial seizures evolving to generalized seizures      History of Present Illness:    He is taking levetiracetam 1500 mg tid and lamotrigine 450-300-400. He decreased his lamotrigine evening dose from 450 to 400 due to slight dizziness.     No recent illnesses, ER visit or hospitalization.      ASD levels 2022:    Levetiracetam: 43.9  Lamotrigine: 15.2    Current Outpatient Medications   Medication Sig Dispense Refill     acetaminophen (TYLENOL) 325 MG tablet Take 325-650 mg by mouth every 6 hours as needed for mild pain       ibuprofen (ADVIL/MOTRIN) 200 MG capsule Take 200 mg by mouth every 4 hours as needed for fever       lamoTRIgine (LAMICTAL) 100 MG tablet TAKE 4 1/2 TABLETS BY MOUTH AM,  3 AT NOON, 4 1/2 AT HS.Please keep 3-28-23 clinic appt for refills. 360 tablet 1     levETIRAcetam (KEPPRA) 500 MG tablet TAKE 3 TABLETS BY MOUTH THREE TIMES DAILY 270 tablet 11     Docusate Sodium (COLACE  PO) Take by mouth daily Take 1 am and 1 pm (Patient not taking: Reported on 1/20/2022)       enoxaparin (LOVENOX) 100 MG/ML SOLN Inject 175 mg Subcutaneous every 12 hours (Patient not taking: Reported on 1/20/2022)       LORazepam (ATIVAN PO) Take 0.5 mg by mouth (Patient not taking: Reported on 1/20/2022)          Medication Notes:        AED Medication Compliance:  noncompliant some of the time    Other Issues:    Is patient safe to drive:  Yes    Assessment and Plan:    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  He decreased evening dose of his lamotrigine due to slight dizziness. His dizziness improved with lowering his lamotrigine. He is currently taking lamotrigine 450-300-400 and levetiracetam 1500 mg bid.  His ASD levels are therapeutic.  Denies side effects.      - Continue lamotrigine and levetiracetam as before.      - Obtain lamotrigine level      - Follow up in 6 months.      As described above, I met with the patient for 7 minutes and during this time counseling was greater than 50% of the visit time.  Flaquita Tam MD

## 2023-04-06 ENCOUNTER — TELEPHONE (OUTPATIENT)
Dept: NEUROLOGY | Facility: CLINIC | Age: 53
End: 2023-04-06

## 2023-04-06 DIAGNOSIS — G40.109 FOCAL EPILEPSY (H): Primary | ICD-10-CM

## 2023-04-06 NOTE — TELEPHONE ENCOUNTER
What is the concern that needs to be addressed by a nurse?     Kip Dailey is calling asking that orders be placed for lamotrigine level lab draw per last visit with Dr. Mohan. Patient is asking that orders be faxed to [facility] at [phone].    Patient is also asking for a call back for clarification on AED non compliance per note from last visit with Dr. Mohan.    May a detailed message be left on voicemail? YES    Date of last office visit: 03/28/23    Message routed to: MINCEP RN

## 2023-04-07 NOTE — TELEPHONE ENCOUNTER
"Returned call to patient. He is concerned about \"AED Medication Compliance:  noncompliant some of the time\" being in his most recent progress note and he wonders if he's not doing something that he should be doing. He reports is taking antiepileptic drugs as prescribed (antiepileptic drug levels all therapeutic, some high therapeutic), get labs done and follows up in clinic. Previous notes say he is compliant. We discussed that it may just be an error but he wants to make sure that he is following all of Dr. Mohan recommendations. He is hoping that she can send him a quick message to confirm that he is following all of her recommendations. I told him I would forward this to her. He is fine with a my chart message if that is easier.         Suzy Ivory PA-C  "

## 2023-04-07 NOTE — TELEPHONE ENCOUNTER
Called patient. No answer. left message that lab order sent to clinic and that if he had questions about what is written in Dr. Mohan note it may be best to discuss with her at next visit. Return number left and told he could call back if wanted to discuss.     Fax number provided repeatedly failed. Clinic clinic and they provided fax number [phone]. Was faxed to this number.       Suzy Ivory PA-C

## 2023-04-07 NOTE — TELEPHONE ENCOUNTER
Patient would like a call back for clarification on the AED non compliance per note from last visit.

## 2023-04-15 DIAGNOSIS — G40.109 LOCALIZATION-RELATED EPILEPSY (H): ICD-10-CM

## 2023-04-15 DIAGNOSIS — G40.109 PARTIAL SEIZURE DISORDER (H): ICD-10-CM

## 2023-04-19 RX ORDER — LAMOTRIGINE 100 MG/1
TABLET ORAL
Qty: 360 TABLET | Refills: 5 | Status: SHIPPED | OUTPATIENT
Start: 2023-04-19 | End: 2023-11-01

## 2023-04-19 NOTE — TELEPHONE ENCOUNTER
lamoTRIgine (LAMICTAL) 100 MG tablet      Last Written Prescription Date:  2-13-23  Last Fill Quantity: 360,   # refills: 1  Last Office Visit : 3-28-23  Future Office visit:  None    Last clinic note:  Obtain lamotrigine level       4-6-23 tele note:Kip Dailey is calling asking that orders be placed for lamotrigine level lab draw per last visit with Dr. Mohan.   Patient is asking that orders be faxed to [facility] at [phone].    Routing refill request to provider for review/approval because:  Clinic RN:  ? If outside lab done

## 2023-04-19 NOTE — TELEPHONE ENCOUNTER
What is the concern that needs to be addressed by a nurse? Patient only has enough medication for tomorrow.     May a detailed message be left on voicemail? yes    Date of last office visit: 03/28/2023    Message routed to:  MT Zuleta

## 2023-04-19 NOTE — TELEPHONE ENCOUNTER
What is the concern that needs to be addressed by a nurse? Patient called in stating he only has medication for tomorrow and need an refilled to be sent to local pharmacy.       [facility] [place] - [address] [address]  Phone: [phone] Fax: [phone]      May a detailed message be left on voicemail? Yes       Message routed to: [facility] RN Pool

## 2023-04-30 ENCOUNTER — HEALTH MAINTENANCE LETTER (OUTPATIENT)
Age: 53
End: 2023-04-30

## 2023-10-06 DIAGNOSIS — G40.209 LOCALIZATION-RELATED PARTIAL EPILEPSY WITH COMPLEX PARTIAL SEIZURES (H): ICD-10-CM

## 2023-10-06 DIAGNOSIS — G40.109 PARTIAL SEIZURE DISORDER (H): ICD-10-CM

## 2023-10-10 RX ORDER — LEVETIRACETAM 500 MG/1
TABLET ORAL
Qty: 270 TABLET | Refills: 5 | Status: SHIPPED | OUTPATIENT
Start: 2023-10-10 | End: 2024-04-04

## 2023-10-10 NOTE — TELEPHONE ENCOUNTER
levETIRAcetam 500 MG Oral Tablet   Last Written Prescription Date:   10/11/2022  Last Fill Quantity: 270,   # refills: 11  Last Office Visit :  3/28/2023  Future Office visit:  None  270 Tabs, 5 Refills sent to luci Miller RN  Central Triage Red Flags/Med Refills

## 2023-10-31 DIAGNOSIS — G40.109 PARTIAL SEIZURE DISORDER (H): ICD-10-CM

## 2023-10-31 DIAGNOSIS — G40.109 LOCALIZATION-RELATED EPILEPSY (H): ICD-10-CM

## 2023-11-01 RX ORDER — LAMOTRIGINE 100 MG/1
TABLET ORAL
Qty: 360 TABLET | Refills: 3 | Status: SHIPPED | OUTPATIENT
Start: 2023-11-01 | End: 2024-03-05

## 2023-11-01 NOTE — TELEPHONE ENCOUNTER
Patient is out of medication. Asking that script be sent to     [id number] - [place], [place] - [address] [address]  Phone: [phone] Fax: [phone]

## 2023-11-01 NOTE — TELEPHONE ENCOUNTER
Last seen: 3/2023  RTC: 6 months  Cancel: none  No-show: none  Next appt: not scheduled    Incoming refill from patient via mychart    Medication requested: lamoTRIgine (LAMICTAL) 100 MG tablet   Directions: TAKE 4 & 1/2 (FOUR & ONE-HALF) TABLETS BY MOUTH IN THE MORNING AND 3 AT NOON AND 4 & 1/2 (FOUR & ONE-HALF) AT BEDTIME. Please schedule appointment with Dr. Clarek's for more refills [phone].   Qty: 4/2023  Last refilled: 360 tablets    Medication refill approved per refill protocol

## 2024-03-05 DIAGNOSIS — G40.109 PARTIAL SEIZURE DISORDER (H): ICD-10-CM

## 2024-03-05 DIAGNOSIS — G40.109 LOCALIZATION-RELATED EPILEPSY (H): ICD-10-CM

## 2024-03-05 RX ORDER — LAMOTRIGINE 100 MG/1
TABLET ORAL
Qty: 360 TABLET | Refills: 2 | Status: SHIPPED | OUTPATIENT
Start: 2024-03-05 | End: 2024-05-07

## 2024-03-05 NOTE — TELEPHONE ENCOUNTER
Last seen: 3/28/  RTC: 6 months  Cancel: none  No-show: none  Next appt: 5/17    Incoming refill from patient via phone    Medication requested: lamoTRIgine (LAMICTAL) 100 MG tablet   Directions: TAKE 4 & 1/2 (FOUR & ONE-HALF) TABLETS BY MOUTH IN THE MORNING AND 3 AT NOON AND 4 & 1/2 (FOUR & ONE-HALF) AT BEDTIME. Please schedule appointment with Dr. Clarke's for more refills [phone].   Qty: 360  Last refilled: 11/1    Medication refill approved per refill protocol

## 2024-03-18 ENCOUNTER — TELEPHONE (OUTPATIENT)
Dept: NEUROLOGY | Facility: CLINIC | Age: 54
End: 2024-03-18

## 2024-03-29 DIAGNOSIS — G40.209 LOCALIZATION-RELATED PARTIAL EPILEPSY WITH COMPLEX PARTIAL SEIZURES (H): Primary | ICD-10-CM

## 2024-03-29 DIAGNOSIS — G40.109 PARTIAL SEIZURE DISORDER (H): ICD-10-CM

## 2024-04-01 NOTE — TELEPHONE ENCOUNTER
Form reviewed, signed by provider.  Form faxed and mailed to DMV on 3/29/24. Copy of form mailed to patient.  Form place in bin to be scanned into the chart.  Claudia Vázquez CMA

## 2024-04-04 RX ORDER — LEVETIRACETAM 500 MG/1
TABLET ORAL
Qty: 270 TABLET | Refills: 0 | Status: SHIPPED | OUTPATIENT
Start: 2024-04-04 | End: 2024-04-18

## 2024-04-04 NOTE — TELEPHONE ENCOUNTER
Refill request called to [facility] after getting e-prescribing error. Keppra 500mg, 3 tabs TID.

## 2024-04-10 DIAGNOSIS — G40.209 LOCALIZATION-RELATED PARTIAL EPILEPSY WITH COMPLEX PARTIAL SEIZURES (H): ICD-10-CM

## 2024-04-10 DIAGNOSIS — G40.109 PARTIAL SEIZURE DISORDER (H): ICD-10-CM

## 2024-04-18 RX ORDER — LEVETIRACETAM 500 MG/1
TABLET ORAL
Qty: 270 TABLET | Refills: 0 | Status: SHIPPED | OUTPATIENT
Start: 2024-04-18 | End: 2024-05-07

## 2024-04-18 NOTE — TELEPHONE ENCOUNTER
LCV:3/28/2023  [facility]  NCV:5-7-24    Filling per [facility] medication refill protocols - seizure medications.  Not all labs required.

## 2024-05-07 ENCOUNTER — OFFICE VISIT (OUTPATIENT)
Dept: NEUROLOGY | Facility: CLINIC | Age: 54
End: 2024-05-07
Payer: COMMERCIAL

## 2024-05-07 VITALS
HEART RATE: 76 BPM | DIASTOLIC BLOOD PRESSURE: 68 MMHG | OXYGEN SATURATION: 98 % | SYSTOLIC BLOOD PRESSURE: 103 MMHG | TEMPERATURE: 97.5 F

## 2024-05-07 DIAGNOSIS — G40.209 LOCALIZATION-RELATED PARTIAL EPILEPSY WITH COMPLEX PARTIAL SEIZURES (H): ICD-10-CM

## 2024-05-07 DIAGNOSIS — G40.109 LOCALIZATION-RELATED EPILEPSY (H): ICD-10-CM

## 2024-05-07 DIAGNOSIS — G40.109 PARTIAL SEIZURE DISORDER (H): ICD-10-CM

## 2024-05-07 RX ORDER — LEVETIRACETAM 500 MG/1
TABLET ORAL
Qty: 270 TABLET | Refills: 11 | Status: SHIPPED | OUTPATIENT
Start: 2024-05-07

## 2024-05-07 RX ORDER — LAMOTRIGINE 100 MG/1
TABLET ORAL
Qty: 330 TABLET | Refills: 11 | Status: SHIPPED | OUTPATIENT
Start: 2024-05-07

## 2024-05-07 ASSESSMENT — PAIN SCALES - GENERAL: PAINLEVEL: NO PAIN (0)

## 2024-05-07 NOTE — PROGRESS NOTES
[facility] Epilepsy Care Progress Note      Patient:  Kip Dailey  :  1970   Age:  53 year old   Today's Office Visit:  2024    Epilepsy Data:    Patient History  Primary Epileptologist/Provider: Flaquita Tam M.D.  Patient Status: Controlled  Epilepsy Syndrome: Parietal lobe epilepsy  Epilepsy Syndrome Status: Final  Etiology  : Malformation of Cortical Development      Tests/Surgery History  Last EE12  Last MRI: 08  Last Neuropsych Testin08     Seizure Record  Current Visit Date: 24  Previous Visit Date: 23  Seizure Type 1: Simple partial seizures with sensory symptoms  Seizure Type 2: Simple partial seizures with motor signs  Seizure Type 3: Complex partial seizures unspecified  Seizure Type 4: Partial seizures with secondary generalization  -  with complex partial seizures evolving to generalized seizures      History of Present Illness:     Mr. Kip Dailey presents to the clinic for a follow up on his epilepsy.  His seizures are controlled on dual therapy with lamotrigine and levetiracetam.       He is taking levetiracetam 1500 mg tid and lamotrigine 450-250-400.  He denies side effects. He denies dizziness or unsteadiness with the current regimen.      He has a n/o Hodgkin's lymphoma. He is in remission, last times checked end of March.     ASD levels 2023:    Lamotrigine 18.2    Keppra level 2022:  43.9      Current Outpatient Medications   Medication Sig Dispense Refill    acetaminophen (TYLENOL) 325 MG tablet Take 325-650 mg by mouth every 6 hours as needed for mild pain      ibuprofen (ADVIL/MOTRIN) 200 MG capsule Take 200 mg by mouth every 4 hours as needed for fever      lamoTRIgine (LAMICTAL) 100 MG tablet TAKE 4 & 1/2 (FOUR & ONE-HALF) TABLETS BY MOUTH IN THE MORNING AND 3 AT NOON AND 4 & 1/2 (FOUR & ONE-HALF) AT BEDTIME. (Patient taking differently: TAKE 4 & 1/2 (FOUR & ONE-HALF) TABLETS BY MOUTH IN THE MORNING AND 2 &1/2  AT  NOON AND 4 & 1/2 (FOUR & ONE-HALF) AT BEDTIME.) 360 tablet 2    levETIRAcetam (KEPPRA) 500 MG tablet Take 3 Tablets by mouth three times a day. 270 tablet 0    Docusate Sodium (COLACE PO) Take by mouth daily Take 1 am and 1 pm (Patient not taking: Reported on 1/20/2022)      enoxaparin (LOVENOX) 100 MG/ML SOLN Inject 175 mg Subcutaneous every 12 hours (Patient not taking: Reported on 1/20/2022)      LORazepam (ATIVAN PO) Take 0.5 mg by mouth (Patient not taking: Reported on 1/20/2022)          Medication Notes:        AED Medication Compliance:  compliant all of the time    Other Issues:    Is patient safe to drive:  Yes    Exam:    /68 (BP Location: Right arm, Patient Position: Sitting, Cuff Size: Adult Regular)   Pulse 76   Temp 97.5  F (36.4  C) (Temporal)   SpO2 98%      Wt Readings from Last 5 Encounters:   03/28/23 291 lb (132 kg)   08/02/22 300 lb 3.2 oz (136.2 kg)   01/16/20 295 lb (133.8 kg)   07/11/19 288 lb 12.8 oz (131 kg)   01/10/19 299 lb (135.6 kg)     General Appearance: Alert, awake, cooperative, pleasant, NAD  Gait:  steady  Attention Span:  Normal  Language/speech: no aphasia or dysarthria  Extraocular Movements:  Normal  Coordination:  Normal FNF  Facial Strength:  Normal  Motor Exam: normal tone, bulk and strength 5/5 bilaterally    Assessment and Plan:    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  He decreased evening dose of his lamotrigine due to slight dizziness. His dizziness improved with lowering his lamotrigine. He is currently taking lamotrigine 450-250-400 and levetiracetam 1500 mg bid.  Denies side effects.      - Continue lamotrigine and levetiracetam as before.      - Obtain ASD levels      - Follow up in 1 year.        As described above, I met with the patient for 20 minutes and during this time counseling was greater than 50% of the visit time.  Flaquita Tam MD

## 2024-05-07 NOTE — LETTER
2024       RE: Kip Dailey  : 1970   MRN: [id number]      Dear Colleague,    Thank you for referring your patient, Kip Dailey, to the [facility] EPILEPSY CARE at [facility]. Please see a copy of my visit note below.    [facility] Epilepsy Care Progress Note      Patient:  Kip Dailey  :  1970   Age:  53 year old   Today's Office Visit:  2024    Epilepsy Data:    Patient History  Primary Epileptologist/Provider: Flaquita Tam M.D.  Patient Status: Controlled  Epilepsy Syndrome: Parietal lobe epilepsy  Epilepsy Syndrome Status: Final  Etiology  : Malformation of Cortical Development      Tests/Surgery History  Last EE12  Last MRI: 08  Last Neuropsych Testin08     Seizure Record  Current Visit Date: 24  Previous Visit Date: 23  Seizure Type 1: Simple partial seizures with sensory symptoms  Seizure Type 2: Simple partial seizures with motor signs  Seizure Type 3: Complex partial seizures unspecified  Seizure Type 4: Partial seizures with secondary generalization  -  with complex partial seizures evolving to generalized seizures      History of Present Illness:     Mr. Kip Dailey presents to the clinic for a follow up on his epilepsy.  His seizures are controlled on dual therapy with lamotrigine and levetiracetam.       He is taking levetiracetam 1500 mg tid and lamotrigine 450-250-400.  He denies side effects. He denies dizziness or unsteadiness with the current regimen.      He has a n/o Hodgkin's lymphoma. He is in remission, last times checked end of March.     ASD levels 2023:    Lamotrigine 18.2    Keppra level 2022:  43.9      Current Outpatient Medications   Medication Sig Dispense Refill     acetaminophen (TYLENOL) 325 MG tablet Take 325-650 mg by mouth every 6 hours as needed for mild pain       ibuprofen (ADVIL/MOTRIN) 200 MG capsule Take 200 mg by mouth every 4  hours as needed for fever       lamoTRIgine (LAMICTAL) 100 MG tablet TAKE 4 & 1/2 (FOUR & ONE-HALF) TABLETS BY MOUTH IN THE MORNING AND 3 AT NOON AND 4 & 1/2 (FOUR & ONE-HALF) AT BEDTIME. (Patient taking differently: TAKE 4 & 1/2 (FOUR & ONE-HALF) TABLETS BY MOUTH IN THE MORNING AND 2 &1/2  AT NOON AND 4 & 1/2 (FOUR & ONE-HALF) AT BEDTIME.) 360 tablet 2     levETIRAcetam (KEPPRA) 500 MG tablet Take 3 Tablets by mouth three times a day. 270 tablet 0     Docusate Sodium (COLACE PO) Take by mouth daily Take 1 am and 1 pm (Patient not taking: Reported on 1/20/2022)       enoxaparin (LOVENOX) 100 MG/ML SOLN Inject 175 mg Subcutaneous every 12 hours (Patient not taking: Reported on 1/20/2022)       LORazepam (ATIVAN PO) Take 0.5 mg by mouth (Patient not taking: Reported on 1/20/2022)          Medication Notes:        AED Medication Compliance:  compliant all of the time    Other Issues:    Is patient safe to drive:  Yes    Exam:    /68 (BP Location: Right arm, Patient Position: Sitting, Cuff Size: Adult Regular)   Pulse 76   Temp 97.5  F (36.4  C) (Temporal)   SpO2 98%      Wt Readings from Last 5 Encounters:   03/28/23 291 lb (132 kg)   08/02/22 300 lb 3.2 oz (136.2 kg)   01/16/20 295 lb (133.8 kg)   07/11/19 288 lb 12.8 oz (131 kg)   01/10/19 299 lb (135.6 kg)     General Appearance: Alert, awake, cooperative, pleasant, NAD  Gait:  steady  Attention Span:  Normal  Language/speech: no aphasia or dysarthria  Extraocular Movements:  Normal  Coordination:  Normal FNF  Facial Strength:  Normal  Motor Exam: normal tone, bulk and strength 5/5 bilaterally    Assessment and Plan:    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  He decreased evening dose of his lamotrigine due to slight dizziness. His dizziness improved with lowering his lamotrigine. He is currently taking lamotrigine 450-250-400 and levetiracetam 1500 mg bid.  Denies side effects.      - Continue lamotrigine and levetiracetam as before.       - Obtain ASD levels      - Follow up in 1 year.        As described above, I met with the patient for 20 minutes and during this time counseling was greater than 50% of the visit time.  Flaquita Tam MD                      Again, thank you for allowing me to participate in the care of your patient.      Sincerely,    Flaquita Tam MD

## 2024-05-08 ENCOUNTER — TELEPHONE (OUTPATIENT)
Dept: NEUROLOGY | Facility: CLINIC | Age: 54
End: 2024-05-08

## 2024-05-08 NOTE — TELEPHONE ENCOUNTER
Prior Authorization Retail Medication Request    Medication/Dose: Lamotrigine (Lamictal) 100 MG Tablet  Diagnosis and ICD code (if different than what is on RX):    New/renewal/insurance change PA/secondary ins. PA:  Previously Tried and Failed:  See Chart  Rationale:  See Chart    Insurance   Primary:   Insurance ID:      Secondary (if applicable):  Insurance ID:      Pharmacy Information (if different than what is on RX)  Name:    Phone:    Fax:

## 2024-06-07 NOTE — TELEPHONE ENCOUNTER
Prior Authorization Not Needed per Insurance    Medication: LAMOTRIGINE 100 MG PO TABS  Insurance Company: [employer]-Specialty PA's - Phone [phone] Fax [phone]  Expected CoPay: $    Pharmacy Filling the Rx: [facility] - [facility] [address], [address] AT [facility]  Pharmacy Notified: Pharmacy had paid claim- was refill too soon  Patient Notified: Yes

## 2024-07-07 ENCOUNTER — HEALTH MAINTENANCE LETTER (OUTPATIENT)
Age: 54
End: 2024-07-07

## 2024-12-23 ENCOUNTER — TELEPHONE (OUTPATIENT)
Dept: NEUROLOGY | Facility: CLINIC | Age: 54
End: 2024-12-23

## 2024-12-23 DIAGNOSIS — G40.209 LOCALIZATION-RELATED PARTIAL EPILEPSY WITH COMPLEX PARTIAL SEIZURES (H): ICD-10-CM

## 2024-12-23 DIAGNOSIS — G40.109 PARTIAL SEIZURE DISORDER (H): ICD-10-CM

## 2024-12-23 RX ORDER — LEVETIRACETAM 750 MG/1
1500 TABLET ORAL 2 TIMES DAILY
Qty: 360 TABLET | Refills: 3 | Status: SHIPPED | OUTPATIENT
Start: 2024-12-23

## 2024-12-23 NOTE — TELEPHONE ENCOUNTER
Incoming call from pt stating that the insurance is giving him issues about the amount of pills he takes, the pharmacy suggested the pt speak to his provider about switching him from three 500 MG tablets a day to two tablets a day at 750 MG. Pt requesting a call back at [phone]    Last seen 05/27/24  Next appt No follow up scheduled

## 2024-12-23 NOTE — TELEPHONE ENCOUNTER
Call placed to Kip. Received voicemail and left message informing him that we can change levETIRAcetam (KEPPRA) from 500 mg (3 tablets BID) to 750 mg (2 tablets 2x daily). Informed him that writer would update prescription and send to pharmacy. CBN if questions.     From Dr. Mohan office visit note: levetiracetam 1500 mg bid

## 2024-12-30 ENCOUNTER — TELEPHONE (OUTPATIENT)
Dept: NEUROLOGY | Facility: CLINIC | Age: 54
End: 2024-12-30

## 2024-12-30 DIAGNOSIS — G40.109 PARTIAL SEIZURE DISORDER (H): ICD-10-CM

## 2024-12-30 DIAGNOSIS — G40.209 LOCALIZATION-RELATED PARTIAL EPILEPSY WITH COMPLEX PARTIAL SEIZURES (H): ICD-10-CM

## 2024-12-30 NOTE — TELEPHONE ENCOUNTER
Please leave a detailed message to [phone]. Patient would like to hear from either Dr. Mohan or nurse.

## 2024-12-31 NOTE — TELEPHONE ENCOUNTER
Writer received call back from patient.  He states that previously he had been taking 1500 mg of keppra TID and this has helped keep his seizures under control.  He called in last week asking if we could switch to a 750 mg tablet due to insurance.  However when prescription was entered it was entered at BID instead of TID, patient was concerned about lowering dose as his control has been good on the TID dosing.        Writer instructed patient to take his medication as he had been taking it TID dosing until Dr. Mohan responds to this writers message.  He verbalized understanding and had no further questions.

## 2025-01-16 RX ORDER — LEVETIRACETAM 750 MG/1
1500 TABLET ORAL 3 TIMES DAILY
Qty: 540 TABLET | Refills: 3 | Status: SHIPPED | OUTPATIENT
Start: 2025-01-16

## 2025-02-01 ENCOUNTER — MYC MEDICAL ADVICE (OUTPATIENT)
Dept: NEUROLOGY | Facility: CLINIC | Age: 55
End: 2025-02-01

## 2025-05-16 DIAGNOSIS — G40.109 PARTIAL SEIZURE DISORDER (H): ICD-10-CM

## 2025-05-16 DIAGNOSIS — G40.109 LOCALIZATION-RELATED EPILEPSY (H): ICD-10-CM

## 2025-05-19 RX ORDER — LAMOTRIGINE 100 MG/1
TABLET ORAL
Qty: 330 TABLET | Refills: 0 | Status: SHIPPED | OUTPATIENT
Start: 2025-05-19

## 2025-05-19 NOTE — TELEPHONE ENCOUNTER
Last Written Prescription:  lamoTRIgine (LAMICTAL) 100 MG tablet 330 tablet 11 5/7/2024     ----------------------  Last Visit Date: 5/7/24  Future Visit Date: 0  ----------------------      Refill decision: Medication refilled per  Medication Refill in Ambulatory Care  policy.   [x]  If no future appointment scheduled: Route to Clinic Coordinators to contact the pt for appointment.    A one-time only 30-day demetria refill given, pt overdue for follow-up . FYI sent to care team.    Filling per [facility] medication refill protocols - seizure medications.  Not all labs required.     Request from pharmacy:  Requested Prescriptions   Pending Prescriptions Disp Refills    lamoTRIgine (LAMICTAL) 100 MG tablet [Pharmacy Med Name: lamoTRIgine 100 MG Oral Tablet (LaMICtal)] 330 tablet 11     Sig: Take 4.5 Tablets by mouth every morning AND 2.5 Tablets one time a day at noon AND 4 Tablets at bedtime.       Anti-Seizure Meds Protocol  Failed - 5/19/2025  3:41 PM        Failed - Review Authorizing provider's last note.      Refer to last progress notes: confirm request is for original authorizing provider (cannot be through other providers).          Failed - Normal ALT or AST on file in past 26 months     No lab results found.  No lab results found.          Failed - Medication is active on med list and the sig matches. RN to manually verify dose and sig if red X/fail.     If the protocol passes (green check), you do not need to verify med dose and sig.    A prescription matches if they are the same clinical intention.    For Example: once daily and every morning are the same.    The protocol can not identify upper and lower case letters as matching and will fail.     For Example: Take 1 tablet (50 mg) by mouth daily     TAKE 1 TABLET (50 MG) BY MOUTH DAILY    For all fails (red x), verify dose and sig.    If the refill does match what is on file, the RN can still proceed to approve the refill request.       If they do not match,  route to the appropriate provider.             Failed - Has GFR on file in past 12 months and most recent value is normal        Failed - Recent (12 mo) or future (90 days) visit within the authorizing provider's specialty     The patient must have completed an in-person or virtual visit within the past 12 months or has a future visit scheduled within the next 90 days with the authorizing provider s specialty.  Urgent care and e-visits do not qualify as an office visit for this protocol.          Passed - Medication indicated for associated diagnosis     Medication is associated with one or more of the following diagnoses:     Bipolar   Dementia   Depression   Epilepsy   Migraine   Seizure   Trigeminal Neuralgia   Cyclothymia

## 2025-05-20 ENCOUNTER — TELEPHONE (OUTPATIENT)
Dept: NEUROLOGY | Facility: CLINIC | Age: 55
End: 2025-05-20

## 2025-06-05 ENCOUNTER — TELEPHONE (OUTPATIENT)
Dept: NEUROLOGY | Facility: CLINIC | Age: 55
End: 2025-06-05

## 2025-06-05 NOTE — TELEPHONE ENCOUNTER
Received DMV (LOC)  Form to be completed. Form saved to [employer], encounter routed.   ALMA Bolaños

## 2025-06-09 NOTE — TELEPHONE ENCOUNTER
DMV form signed, faxed and mailed to [employer] on 06/09/2025, sent to scanning, and copy mailed to patient.

## 2025-07-01 ENCOUNTER — OFFICE VISIT (OUTPATIENT)
Dept: NEUROLOGY | Facility: CLINIC | Age: 55
End: 2025-07-01
Payer: COMMERCIAL

## 2025-07-01 VITALS
OXYGEN SATURATION: 100 % | DIASTOLIC BLOOD PRESSURE: 77 MMHG | SYSTOLIC BLOOD PRESSURE: 121 MMHG | HEART RATE: 68 BPM | HEIGHT: 76 IN | WEIGHT: 273 LBS | TEMPERATURE: 97.2 F | BODY MASS INDEX: 33.24 KG/M2

## 2025-07-01 DIAGNOSIS — G40.109 LOCALIZATION-RELATED EPILEPSY (H): ICD-10-CM

## 2025-07-01 DIAGNOSIS — G40.109 FOCAL EPILEPSY (H): Primary | ICD-10-CM

## 2025-07-01 DIAGNOSIS — G40.109 PARTIAL SEIZURE DISORDER (H): ICD-10-CM

## 2025-07-01 RX ORDER — LAMOTRIGINE 100 MG/1
TABLET ORAL
Qty: 945 TABLET | Refills: 3 | Status: SHIPPED | OUTPATIENT
Start: 2025-07-01

## 2025-07-01 NOTE — PROGRESS NOTES
"[facility] Epilepsy Care Progress Note      Patient:  Kip Dailey  :  1970   Age:  55 year old   Today's Office Visit:  2025    Epilepsy Data:  Patient History  Primary Epileptologist/Provider: Flaquita Tam M.D.  Patient Status: Controlled  Epilepsy Syndrome: Parietal lobe epilepsy  Epilepsy Syndrome Status: Final  Etiology  : Malformation of Cortical Development      Tests/Surgery History  Last EE12  Last MRI: 08  Last Neuropsych Testin08     Seizure Record  Current Visit Date: 25  Previous Visit Date: 24  Seizure Type 1: Simple partial seizures with sensory symptoms  Seizure Type 2: Simple partial seizures with motor signs  Seizure Type 3: Complex partial seizures unspecified  Seizure Type 4: Partial seizures with secondary generalization  -  with complex partial seizures evolving to generalized seizures        History of Present Illness:   Kip Dailey is here for epilepsy follow up. Seizures are well controlled on dual therapy w/ Lamotrigine and Levetiracetam.  No seizures since last office visit.    Planning for repeat neuropsych testing through [facility] ([place] sometime in the next few months. Last was 7 years ago through [facility] ([place]    He is taking levetiracetam 1500 mg tid and lamotrigine 450-250-400. Persistent \"word finding difficulty\" but notes this is unchanged and believed more related top the chemotherapy for his lymphoma. This is more pronounced w/ fatigue, but denies issues with speech fluency or comprehension. Additionally, notes some \"imbalance issues\" ~30 minutes after taking medications in the morning.     Reports ongoing imbalance issues majority of days that begins ~30 minutes after taking AM Lamotirigine dose. This persists constantly for 1 hour before self-resolving without recurrence for remainder of day. Notes that this first began about 5 year after some breakthrough seizures that required increasing the " "lamotrigine doses. Was stable and began titrating down, and noticed lessening of this daily issue with taking the Lamotrigine AM dose. Acknowledges that if he begins noticing onset of auras or other concerning symptoms he feels comfortable with re-increasing the AM Lamotrigine dose back to original 450.    Additionally, notes he does not drink as much water as he should daily and this may contribute to the daily imbalance. No hx of kidney stones or becoming easily dehydrated.    He has a n/o Hodgkin's lymphoma. He is in remission, last times checked end of March.      ASD levels 4/21/2023:    Lamotrigine 18.2     Keppra level 9/16/2022:  43.9      Current Outpatient Medications   Medication Sig Dispense Refill    acetaminophen (TYLENOL) 325 MG tablet Take 325-650 mg by mouth every 6 hours as needed for mild pain      Docusate Sodium (COLACE PO) Take by mouth daily Take 1 am and 1 pm (Patient not taking: Reported on 1/20/2022)      enoxaparin (LOVENOX) 100 MG/ML SOLN Inject 175 mg Subcutaneous every 12 hours (Patient not taking: Reported on 1/20/2022)      ibuprofen (ADVIL/MOTRIN) 200 MG capsule Take 200 mg by mouth every 4 hours as needed for fever      lamoTRIgine (LAMICTAL) 100 MG tablet Take 4.5 Tablets by mouth every morning AND 2.5 Tablets one time a day at noon AND 4 Tablets at bedtime. 330 tablet 0    levETIRAcetam (KEPPRA) 750 MG tablet Take 2 tablets (1,500 mg) by mouth 3 times daily. 540 tablet 3    LORazepam (ATIVAN PO) Take 0.5 mg by mouth (Patient not taking: Reported on 1/20/2022)          Medication Notes:        AED Medication Compliance:  compliant all of the time  Using a pill box:  Yes    Other Issues:    Is patient safe to drive:  Yes    Exam:    /77   Pulse 68   Temp 97.2  F (36.2  C) (Temporal)   Ht 6' 4\" (193 cm)   Wt 273 lb (123.8 kg)   SpO2 100%   BMI 33.23 kg/m       Wt Readings from Last 5 Encounters:   07/01/25 273 lb (123.8 kg)   03/28/23 291 lb (132 kg)   08/02/22 300 lb 3.2 " oz (136.2 kg)   01/16/20 295 lb (133.8 kg)   07/11/19 288 lb 12.8 oz (131 kg)     General Appearance: alert and oriented x4, cooperative, pleasant, NAD  Gait:  steady  Language:  fluent and intact comprehension  Extraocular Movements:  PERRL, EOMI  Coordination:  finger to nose  Visual Fields:  full intact  Facial Sensation:  symmetric smile and eyebrow raise, midline tongue protrusion  Facial Strength:  intact V1-V3 sensation  Motor exam: 5/5 strength throughout  Limb Sensation:  intact throughout to light touch    Assessment and Plan:     Focal epilepsy:  Patient had no seizures since last visit.  His unsteadiness improved with lowering his lamotrigine PM dose at LOV 5/7/2024. He is currently taking lamotrigine 450-250-400 and levetiracetam 1500 mg tid. Given imbalance sensation onset timing and lack of other concerning findings, should be ok to reduce AM Lamotirigine dose down to 400mg - new Lamotrigine regimen now is 400-250-400. Understands concerning signs/symptoms to be aware that should prompt contacting the clinic and resumption of original Lamotrigine 450mg AM dose. Continue on Keppra as above.  Denies side effects.   Will consider increasing LMT noon dose in case of increased seizures.     - Decrease LMT to 400-200-400    - Continue Keppra as before    - Obtain ASD levels      - Follow up in 1 year.          Jj Buckley, MS4    I was present with the medical student who participated in the service and in the documentation of the note. I have verified the history and personally performed the physical exam and medical decision making. I agree with the assessment and plan of care as documented in the note.  Flaquita Tam MD

## 2025-07-01 NOTE — LETTER
"2025       RE: Kip Dailey  : 1970   MRN: [id number]      Dear Colleague,    Thank you for referring your patient, Kip Dailey, to the [facility] EPILEPSY CARE at [facility]. Please see a copy of my visit note below.    [facility] Epilepsy Care Progress Note      Patient:  Kip Dailey  :  1970   Age:  55 year old   Today's Office Visit:  2025    Epilepsy Data:  Patient History  Primary Epileptologist/Provider: Flaquita Tam M.D.  Patient Status: Controlled  Epilepsy Syndrome: Parietal lobe epilepsy  Epilepsy Syndrome Status: Final  Etiology  : Malformation of Cortical Development      Tests/Surgery History  Last EE12  Last MRI: 08  Last Neuropsych Testin08     Seizure Record  Current Visit Date: 25  Previous Visit Date: 24  Seizure Type 1: Simple partial seizures with sensory symptoms  Seizure Type 2: Simple partial seizures with motor signs  Seizure Type 3: Complex partial seizures unspecified  Seizure Type 4: Partial seizures with secondary generalization  -  with complex partial seizures evolving to generalized seizures        History of Present Illness:   Kip Dailey is here for epilepsy follow up. Seizures are well controlled on dual therapy w/ Lamotrigine and Levetiracetam.  No seizures since last office visit.    Planning for repeat neuropsych testing through [facility] ([place]) sometime in the next few months. Last was 7 years ago through [facility] ([place]    He is taking levetiracetam 1500 mg tid and lamotrigine 450-250-400. Persistent \"word finding difficulty\" but notes this is unchanged and believed more related top the chemotherapy for his lymphoma. This is more pronounced w/ fatigue, but denies issues with speech fluency or comprehension. Additionally, notes some \"imbalance issues\" ~30 minutes after taking medications in the morning.     Reports ongoing " imbalance issues majority of days that begins ~30 minutes after taking AM Lamotirigine dose. This persists constantly for 1 hour before self-resolving without recurrence for remainder of day. Notes that this first began about 5 year after some breakthrough seizures that required increasing the lamotrigine doses. Was stable and began titrating down, and noticed lessening of this daily issue with taking the Lamotrigine AM dose. Acknowledges that if he begins noticing onset of auras or other concerning symptoms he feels comfortable with re-increasing the AM Lamotrigine dose back to original 450.    Additionally, notes he does not drink as much water as he should daily and this may contribute to the daily imbalance. No hx of kidney stones or becoming easily dehydrated.    He has a n/o Hodgkin's lymphoma. He is in remission, last times checked end of March.      ASD levels 4/21/2023:    Lamotrigine 18.2     Keppra level 9/16/2022:  43.9      Current Outpatient Medications   Medication Sig Dispense Refill     acetaminophen (TYLENOL) 325 MG tablet Take 325-650 mg by mouth every 6 hours as needed for mild pain       Docusate Sodium (COLACE PO) Take by mouth daily Take 1 am and 1 pm (Patient not taking: Reported on 1/20/2022)       enoxaparin (LOVENOX) 100 MG/ML SOLN Inject 175 mg Subcutaneous every 12 hours (Patient not taking: Reported on 1/20/2022)       ibuprofen (ADVIL/MOTRIN) 200 MG capsule Take 200 mg by mouth every 4 hours as needed for fever       lamoTRIgine (LAMICTAL) 100 MG tablet Take 4.5 Tablets by mouth every morning AND 2.5 Tablets one time a day at noon AND 4 Tablets at bedtime. 330 tablet 0     levETIRAcetam (KEPPRA) 750 MG tablet Take 2 tablets (1,500 mg) by mouth 3 times daily. 540 tablet 3     LORazepam (ATIVAN PO) Take 0.5 mg by mouth (Patient not taking: Reported on 1/20/2022)          Medication Notes:        AED Medication Compliance:  compliant all of the time  Using a pill box:  Yes    Other  "Issues:    Is patient safe to drive:  Yes    Exam:    /77   Pulse 68   Temp 97.2  F (36.2  C) (Temporal)   Ht 6' 4\" (193 cm)   Wt 273 lb (123.8 kg)   SpO2 100%   BMI 33.23 kg/m       Wt Readings from Last 5 Encounters:   07/01/25 273 lb (123.8 kg)   03/28/23 291 lb (132 kg)   08/02/22 300 lb 3.2 oz (136.2 kg)   01/16/20 295 lb (133.8 kg)   07/11/19 288 lb 12.8 oz (131 kg)     General Appearance: alert and oriented x4, cooperative, pleasant, NAD  Gait:  steady  Language:  fluent and intact comprehension  Extraocular Movements:  PERRL, EOMI  Coordination:  finger to nose  Visual Fields:  full intact  Facial Sensation:  symmetric smile and eyebrow raise, midline tongue protrusion  Facial Strength:  intact V1-V3 sensation  Motor exam: 5/5 strength throughout  Limb Sensation:  intact throughout to light touch    Assessment and Plan:     Focal epilepsy:  Patient had no seizures since last visit.  His unsteadiness improved with lowering his lamotrigine PM dose at LOV 5/7/2024. He is currently taking lamotrigine 450-250-400 and levetiracetam 1500 mg tid. Given imbalance sensation onset timing and lack of other concerning findings, should be ok to reduce AM Lamotirigine dose down to 400mg - new Lamotrigine regimen now is 400-250-400. Understands concerning signs/symptoms to be aware that should prompt contacting the clinic and resumption of original Lamotrigine 450mg AM dose. Continue on Keppra as above.  Denies side effects.   Will consider increasing LMT noon dose in case of increased seizures.     - Decrease LMT to 400-200-400    - Continue Keppra as before    - Obtain ASD levels      - Follow up in 1 year.          Jj Buckley, MS4    I was present with the medical student who participated in the service and in the documentation of the note. I have verified the history and personally performed the physical exam and medical decision making. I agree with the assessment and plan of care as documented in " the note.  Flaquita Tam MD                  Again, thank you for allowing me to participate in the care of your patient.      Sincerely,    Flaquita Tam MD

## 2025-07-02 LAB — LEVETIRACETAM SERPL-MCNC: 43.7 ÂΜG/ML (ref 10–40)

## 2025-07-03 LAB — LAMOTRIGINE SERPL-MCNC: 21.3 UG/ML

## 2025-07-13 ENCOUNTER — HEALTH MAINTENANCE LETTER (OUTPATIENT)
Age: 55
End: 2025-07-13